# Patient Record
Sex: FEMALE | Race: WHITE | NOT HISPANIC OR LATINO | Employment: OTHER | URBAN - METROPOLITAN AREA
[De-identification: names, ages, dates, MRNs, and addresses within clinical notes are randomized per-mention and may not be internally consistent; named-entity substitution may affect disease eponyms.]

---

## 2017-10-31 ENCOUNTER — INITIAL CONSULT (OUTPATIENT)
Dept: DERMATOLOGY | Facility: CLINIC | Age: 67
End: 2017-10-31
Payer: MEDICARE

## 2017-10-31 VITALS — HEIGHT: 66 IN | WEIGHT: 180 LBS | BODY MASS INDEX: 28.93 KG/M2

## 2017-10-31 DIAGNOSIS — L82.1 SEBORRHEIC KERATOSES: Primary | ICD-10-CM

## 2017-10-31 DIAGNOSIS — D18.01 CHERRY ANGIOMA: ICD-10-CM

## 2017-10-31 DIAGNOSIS — L81.4 SOLAR LENTIGO: ICD-10-CM

## 2017-10-31 DIAGNOSIS — D22.9 MULTIPLE BENIGN NEVI: ICD-10-CM

## 2017-10-31 PROCEDURE — 99999 PR PBB SHADOW E&M-NEW PATIENT-LVL II: CPT | Mod: PBBFAC,,, | Performed by: DERMATOLOGY

## 2017-10-31 PROCEDURE — 99203 OFFICE O/P NEW LOW 30 MIN: CPT | Mod: S$GLB,,, | Performed by: DERMATOLOGY

## 2017-10-31 RX ORDER — AMOXICILLIN 500 MG
2 CAPSULE ORAL DAILY
COMMUNITY
End: 2019-12-27

## 2017-10-31 RX ORDER — MELATONIN 10 MG
CAPSULE ORAL NIGHTLY
COMMUNITY
End: 2021-01-13

## 2017-10-31 RX ORDER — MULTIVIT WITH MINERALS/HERBS
1 TABLET ORAL DAILY
COMMUNITY
End: 2019-10-21

## 2017-10-31 RX ORDER — CHOLECALCIFEROL (VITAMIN D3) 125 MCG
CAPSULE ORAL
COMMUNITY
End: 2019-10-21

## 2017-10-31 RX ORDER — HYDROGEN PEROXIDE 3 %
20 SOLUTION, NON-ORAL MISCELLANEOUS
COMMUNITY
End: 2022-02-02 | Stop reason: SDUPTHER

## 2017-10-31 NOTE — PROGRESS NOTES
Subjective:       Patient ID:  Ligia Krishnan is a 67 y.o. female who presents for   Chief Complaint   Patient presents with    Skin Check     TBSE    Spot     Face, L side     Initial visit  Previously under care Dr Gato Munoz h/o skin cancer  Denies h/o intense sun exposure; burns easily, mother very fair, protected her kids, no tanning bed use        Spot  - Initial  Affected locations: face  Duration: 4 months  Signs and Symptoms: discoloration, was dry and thick.  Severity: mild  Timing: constant  Aggravated by: nothing  Treatments tried: peroxide otc.        Review of Systems   Constitutional: Negative for fever, chills, weight loss, weight gain and night sweats.   Skin: Positive for daily sunscreen use (make up and moisturizer), activity-related sunscreen use and wears hat.   Hematologic/Lymphatic: Does not bruise/bleed easily.        Objective:    Physical Exam   Constitutional: She appears well-developed and well-nourished. No distress.   Neurological: She is alert and oriented to person, place, and time. She is not disoriented.   Psychiatric: She has a normal mood and affect.   Skin:   Areas Examined (abnormalities noted in diagram):   Scalp / Hair Palpated and Inspected  Head / Face Inspection Performed  Neck Inspection Performed  Chest / Axilla Inspection Performed  Abdomen Inspection Performed  Genitals / Buttocks / Groin Inspection Performed  Back Inspection Performed  RUE Inspected  LUE Inspection Performed  RLE Inspected  LLE Inspection Performed  Nails and Digits Inspection Performed                           Diagram Legend     Erythematous scaling macule/papule c/w actinic keratosis       Vascular papule c/w angioma      Pigmented verrucoid papule/plaque c/w seborrheic keratosis      Yellow umbilicated papule c/w sebaceous hyperplasia      Irregularly shaped tan macule c/w lentigo     1-2 mm smooth white papules consistent with Milia      Movable subcutaneous cyst with punctum c/w epidermal  inclusion cyst      Subcutaneous movable cyst c/w pilar cyst      Firm pink to brown papule c/w dermatofibroma      Pedunculated fleshy papule(s) c/w skin tag(s)      Evenly pigmented macule c/w junctional nevus     Mildly variegated pigmented, slightly irregular-bordered macule c/w mildly atypical nevus      Flesh colored to evenly pigmented papule c/w intradermal nevus       Pink pearly papule/plaque c/w basal cell carcinoma      Erythematous hyperkeratotic cursted plaque c/w SCC      Surgical scar with no sign of skin cancer recurrence      Open and closed comedones      Inflammatory papules and pustules      Verrucoid papule consistent consistent with wart     Erythematous eczematous patches and plaques     Dystrophic onycholytic nail with subungual debris c/w onychomycosis     Umbilicated papule    Erythematous-base heme-crusted tan verrucoid plaque consistent with inflamed seborrheic keratosis     Erythematous Silvery Scaling Plaque c/w Psoriasis     See annotation      Assessment / Plan:        Seborrheic keratoses  These are benign inherited growths without a malignant potential. Reassurance given to patient. No treatment is necessary.     Careful dermoscopic examination of all lesions on face, macular SKs and solar lentigo, none with concerning features    Solar lentigo  This is a benign hyperpigmented sun induced lesion. Daily sun protection will reduce the number of new lesions. Treatment of these benign lesions are considered cosmetic.    Cherry angioma  This is a benign vascular lesion. Reassurance given. No treatment required.     Multiple benign nevi  Monitor for new mole or moles that are becoming bigger, darker, irritated, or developing irregular borders.     Patient instructed in importance in daily sun protection of at least spf 30. Sun avoidance and topical protection discussed.   Recommend Elta MD (physician office) COTZ sensitive (available online) for daily use on face and neck.  Patient  encouraged to wear hat for all outdoor exposure.   Also discussed sun protective clothing.                 Return in about 1 year (around 10/31/2018).

## 2019-09-10 ENCOUNTER — TELEPHONE (OUTPATIENT)
Dept: FAMILY MEDICINE | Facility: CLINIC | Age: 69
End: 2019-09-10

## 2019-09-10 DIAGNOSIS — E06.3 HASHIMOTO'S DISEASE: Primary | ICD-10-CM

## 2019-09-10 NOTE — TELEPHONE ENCOUNTER
----- Message from Lisa Nguyễn sent at 9/10/2019  3:28 PM CDT -----  Contact: pt  Voicemail: Pt needs referral to see:    Florencia Hyde -([place])    [phone]

## 2019-10-21 ENCOUNTER — OFFICE VISIT (OUTPATIENT)
Dept: DERMATOLOGY | Facility: CLINIC | Age: 69
End: 2019-10-21
Payer: MEDICARE

## 2019-10-21 DIAGNOSIS — L73.8 SEBACEOUS HYPERPLASIA OF FACE: ICD-10-CM

## 2019-10-21 DIAGNOSIS — L82.1 SEBORRHEIC KERATOSES: Primary | ICD-10-CM

## 2019-10-21 DIAGNOSIS — D22.9 MULTIPLE BENIGN NEVI: ICD-10-CM

## 2019-10-21 DIAGNOSIS — L81.4 SOLAR LENTIGO: ICD-10-CM

## 2019-10-21 PROCEDURE — 99214 PR OFFICE/OUTPT VISIT, EST, LEVL IV, 30-39 MIN: ICD-10-PCS | Mod: S$GLB,,, | Performed by: DERMATOLOGY

## 2019-10-21 PROCEDURE — 99999 PR PBB SHADOW E&M-EST. PATIENT-LVL II: ICD-10-PCS | Mod: PBBFAC,,, | Performed by: DERMATOLOGY

## 2019-10-21 PROCEDURE — 99214 OFFICE O/P EST MOD 30 MIN: CPT | Mod: S$GLB,,, | Performed by: DERMATOLOGY

## 2019-10-21 PROCEDURE — 99999 PR PBB SHADOW E&M-EST. PATIENT-LVL II: CPT | Mod: PBBFAC,,, | Performed by: DERMATOLOGY

## 2019-10-21 RX ORDER — LEVOTHYROXINE SODIUM 50 UG/1
50 TABLET ORAL
Refills: 6 | COMMUNITY
Start: 2019-10-08 | End: 2020-07-13

## 2019-10-21 RX ORDER — LISINOPRIL 10 MG/1
10 TABLET ORAL DAILY
Refills: 5 | COMMUNITY
Start: 2019-10-08 | End: 2020-07-13

## 2019-10-21 RX ORDER — PRAVASTATIN SODIUM 40 MG/1
40 TABLET ORAL DAILY
COMMUNITY
Start: 2019-09-11 | End: 2019-12-27 | Stop reason: SDUPTHER

## 2019-10-21 RX ORDER — MULTIVITAMIN/IRON/FOLIC ACID 18MG-0.4MG
TABLET ORAL
COMMUNITY
End: 2019-12-27

## 2019-10-21 RX ORDER — ACETAMINOPHEN AND PHENYLEPHRINE HCL 325; 5 MG/1; MG/1
1 TABLET ORAL DAILY
COMMUNITY

## 2019-10-21 RX ORDER — PNV NO.95/FERROUS FUM/FOLIC AC 28MG-0.8MG
TABLET ORAL
COMMUNITY
End: 2019-12-27

## 2019-10-21 NOTE — PROGRESS NOTES
Subjective:       Patient ID:  Ligia Krishnan is a 69 y.o. female who presents for   Chief Complaint   Patient presents with    Skin Check     TBSE    Spot     face x 2 months, red and raised, no tx     Last OV 10-    Patient here today for skin check, TBSE  Also c/o spots on face x 2 months, red and raised, no tx    No PHX NMSC  Mother with AK sister with NMSC        Review of Systems   Constitutional: Negative for fever, chills, weight loss, weight gain and night sweats.   Skin: Positive for daily sunscreen use (make up and moisturizer), activity-related sunscreen use and wears hat.   Hematologic/Lymphatic: Does not bruise/bleed easily.        Objective:    Physical Exam   Constitutional: She appears well-developed and well-nourished. No distress.   Neurological: She is alert and oriented to person, place, and time. She is not disoriented.   Psychiatric: She has a normal mood and affect.   Skin:   Areas Examined (abnormalities noted in diagram):   Scalp / Hair Palpated and Inspected  Head / Face Inspection Performed  Neck Inspection Performed  Chest / Axilla Inspection Performed  Abdomen Inspection Performed  Genitals / Buttocks / Groin Inspection Performed  Back Inspection Performed  RUE Inspected  LUE Inspection Performed  RLE Inspected  LLE Inspection Performed  Nails and Digits Inspection Performed                           Diagram Legend     Erythematous scaling macule/papule c/w actinic keratosis       Vascular papule c/w angioma      Pigmented verrucoid papule/plaque c/w seborrheic keratosis      Yellow umbilicated papule c/w sebaceous hyperplasia      Irregularly shaped tan macule c/w lentigo     1-2 mm smooth white papules consistent with Milia      Movable subcutaneous cyst with punctum c/w epidermal inclusion cyst      Subcutaneous movable cyst c/w pilar cyst      Firm pink to brown papule c/w dermatofibroma      Pedunculated fleshy papule(s) c/w skin tag(s)      Evenly pigmented macule  c/w junctional nevus     Mildly variegated pigmented, slightly irregular-bordered macule c/w mildly atypical nevus      Flesh colored to evenly pigmented papule c/w intradermal nevus       Pink pearly papule/plaque c/w basal cell carcinoma      Erythematous hyperkeratotic cursted plaque c/w SCC      Surgical scar with no sign of skin cancer recurrence      Open and closed comedones      Inflammatory papules and pustules      Verrucoid papule consistent consistent with wart     Erythematous eczematous patches and plaques     Dystrophic onycholytic nail with subungual debris c/w onychomycosis     Umbilicated papule    Erythematous-base heme-crusted tan verrucoid plaque consistent with inflamed seborrheic keratosis     Erythematous Silvery Scaling Plaque c/w Psoriasis     See annotation      Assessment / Plan:        Seborrheic keratoses  These are benign inherited growths without a malignant potential. Reassurance given to patient. No treatment is necessary.     Solar lentigo  This is a benign hyperpigmented sun induced lesion. Daily sun protection will reduce the number of new lesions. Treatment of these benign lesions are considered cosmetic.    Sebaceous hyperplasia of face  This is a common condition representing benign enlargement of the sebaceous lobule. It typically occurs in adulthood. Reassurance given to patient.     Multiple benign nevi  Monitor for new mole or moles that are becoming bigger, darker, irritated, or developing irregular borders.     Total body skin examination performed today including at least 12 points as noted in physical examination. No lesions suspicious for malignancy noted.    Patient instructed in importance in daily sun protection of at least spf 30. Mineral sunscreen ingredients preferred (Zinc +/- Titanium).   Recommend Elta MD for daily use on face and neck.  Patient encouraged to wear hat for all outdoor exposure.   Also discussed sun avoidance and use of protective  clothing.             Follow up in about 1 year (around 10/21/2020), or if symptoms worsen or fail to improve.

## 2019-10-28 ENCOUNTER — TELEPHONE (OUTPATIENT)
Dept: FAMILY MEDICINE | Facility: CLINIC | Age: 69
End: 2019-10-28

## 2019-10-28 NOTE — TELEPHONE ENCOUNTER
----- Message from Ila Judge sent at 10/28/2019 12:42 PM CDT -----  vm- patient needs a call back       Spoke with patient - needs a copy of eCW labs and shot record.  Will  tomorrow.

## 2019-12-26 PROBLEM — E03.9 HYPOTHYROIDISM: Status: ACTIVE | Noted: 2019-06-14

## 2019-12-26 PROBLEM — E78.5 HYPERLIPIDEMIA: Status: ACTIVE | Noted: 2019-12-26

## 2019-12-26 PROBLEM — Z96.643 PRESENCE OF ARTIFICIAL HIP JOINT, BILATERAL: Status: ACTIVE | Noted: 2018-05-23

## 2019-12-26 PROBLEM — E06.3 AUTOIMMUNE THYROIDITIS: Status: ACTIVE | Noted: 2019-06-14

## 2019-12-26 PROBLEM — K21.9 GASTRO-ESOPHAGEAL REFLUX DISEASE WITHOUT ESOPHAGITIS: Status: ACTIVE | Noted: 2017-02-22

## 2019-12-26 PROBLEM — N18.30 CHRONIC RENAL INSUFFICIENCY, STAGE III (MODERATE): Status: ACTIVE | Noted: 2017-05-16

## 2019-12-27 ENCOUNTER — OFFICE VISIT (OUTPATIENT)
Dept: FAMILY MEDICINE | Facility: CLINIC | Age: 69
End: 2019-12-27
Payer: MEDICARE

## 2019-12-27 VITALS
SYSTOLIC BLOOD PRESSURE: 150 MMHG | WEIGHT: 252 LBS | DIASTOLIC BLOOD PRESSURE: 92 MMHG | HEART RATE: 96 BPM | HEIGHT: 66 IN | BODY MASS INDEX: 40.5 KG/M2

## 2019-12-27 DIAGNOSIS — T46.4X5A ACE-INHIBITOR COUGH: ICD-10-CM

## 2019-12-27 DIAGNOSIS — K21.9 GASTRO-ESOPHAGEAL REFLUX DISEASE WITHOUT ESOPHAGITIS: ICD-10-CM

## 2019-12-27 DIAGNOSIS — Z96.643 PRESENCE OF ARTIFICIAL HIP JOINT, BILATERAL: ICD-10-CM

## 2019-12-27 DIAGNOSIS — E06.3 AUTOIMMUNE THYROIDITIS: ICD-10-CM

## 2019-12-27 DIAGNOSIS — R05.8 ACE-INHIBITOR COUGH: ICD-10-CM

## 2019-12-27 DIAGNOSIS — I10 ESSENTIAL HYPERTENSION: Primary | ICD-10-CM

## 2019-12-27 DIAGNOSIS — E03.9 HYPOTHYROIDISM, UNSPECIFIED TYPE: ICD-10-CM

## 2019-12-27 DIAGNOSIS — E78.5 HYPERLIPIDEMIA, UNSPECIFIED HYPERLIPIDEMIA TYPE: ICD-10-CM

## 2019-12-27 DIAGNOSIS — N39.41 URGE URINARY INCONTINENCE: ICD-10-CM

## 2019-12-27 DIAGNOSIS — M17.0 PRIMARY OSTEOARTHRITIS OF BOTH KNEES: ICD-10-CM

## 2019-12-27 PROBLEM — N18.30 CHRONIC RENAL INSUFFICIENCY, STAGE III (MODERATE): Status: RESOLVED | Noted: 2017-05-16 | Resolved: 2019-12-27

## 2019-12-27 PROCEDURE — 3080F PR MOST RECENT DIASTOLIC BLOOD PRESSURE >= 90 MM HG: ICD-10-PCS | Mod: S$GLB,,, | Performed by: FAMILY MEDICINE

## 2019-12-27 PROCEDURE — 3077F SYST BP >= 140 MM HG: CPT | Mod: S$GLB,,, | Performed by: FAMILY MEDICINE

## 2019-12-27 PROCEDURE — 99214 OFFICE O/P EST MOD 30 MIN: CPT | Mod: S$GLB,,, | Performed by: FAMILY MEDICINE

## 2019-12-27 PROCEDURE — 3080F DIAST BP >= 90 MM HG: CPT | Mod: S$GLB,,, | Performed by: FAMILY MEDICINE

## 2019-12-27 PROCEDURE — 3077F PR MOST RECENT SYSTOLIC BLOOD PRESSURE >= 140 MM HG: ICD-10-PCS | Mod: S$GLB,,, | Performed by: FAMILY MEDICINE

## 2019-12-27 PROCEDURE — 1159F MED LIST DOCD IN RCRD: CPT | Mod: S$GLB,,, | Performed by: FAMILY MEDICINE

## 2019-12-27 PROCEDURE — 99214 PR OFFICE/OUTPT VISIT, EST, LEVL IV, 30-39 MIN: ICD-10-PCS | Mod: S$GLB,,, | Performed by: FAMILY MEDICINE

## 2019-12-27 PROCEDURE — 1159F PR MEDICATION LIST DOCUMENTED IN MEDICAL RECORD: ICD-10-PCS | Mod: S$GLB,,, | Performed by: FAMILY MEDICINE

## 2019-12-27 RX ORDER — LEVOTHYROXINE SODIUM 50 UG/1
50 TABLET ORAL
Qty: 90 TABLET | Refills: 1 | Status: CANCELLED | OUTPATIENT
Start: 2019-12-27

## 2019-12-27 RX ORDER — LISINOPRIL 10 MG/1
10 TABLET ORAL DAILY
Qty: 90 TABLET | Refills: 1 | Status: CANCELLED | OUTPATIENT
Start: 2019-12-27

## 2019-12-27 RX ORDER — PRAVASTATIN SODIUM 40 MG/1
40 TABLET ORAL DAILY
Qty: 90 TABLET | Refills: 1 | Status: SHIPPED | OUTPATIENT
Start: 2019-12-27 | End: 2020-06-27 | Stop reason: SDUPTHER

## 2019-12-27 RX ORDER — HYDROCHLOROTHIAZIDE 25 MG/1
25 TABLET ORAL DAILY
Qty: 30 TABLET | Refills: 1 | Status: SHIPPED | OUTPATIENT
Start: 2019-12-27 | End: 2020-02-21 | Stop reason: SDUPTHER

## 2019-12-27 NOTE — PROGRESS NOTES
SUBJECTIVE:    Patient ID: Ligia Krishnan is a 69 y.o. female.    Chief Complaint: Hypertension (brought pictures of bottles -ac )    This 69-year-old female has had a constant hacking cough since starting lisinopril several months ago.  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  She is using cough syrup all night every night and is not sleeping well due to the coughing.    She states she has been under lots of stress with home repairs and recently long extended holiday family visits.  She is getting less sleep and feels rather fatigued.    She complains of severe knee pains due to arthritis and takes Aleve 1-2 tablets twice a day.  She has been eating plenty of salty foods recently      No visits with results within 6 Month(s) from this visit.   Latest known visit with results is:   No results found for any previous visit.       Past Medical History:   Diagnosis Date    Chronic renal insufficiency, stage III (moderate) 5/16/2017    Esophageal reflux     Hyperlipidemia     Hypertension     OA (osteoarthritis) of hip     bilateral     Past Surgical History:   Procedure Laterality Date    APPENDECTOMY  1957    CATARACT EXTRACTION Left 10/2018    COLONOSCOPY  2016    Dr. jose-RTC 7 years    JOINT REPLACEMENT Right 6/13/13    (R) DAPHNEY    JOINT REPLACEMENT Left 10/18/12    (L) DAPHNEY     Family History   Problem Relation Age of Onset    Melanoma Neg Hx     Lupus Neg Hx     Eczema Neg Hx     Psoriasis Neg Hx        Marital Status:   Alcohol History:  reports that she drinks alcohol.  Tobacco History:  reports that she has never smoked. She has never used smokeless tobacco.  Drug History:  has no drug history on file.    Review of patient's allergies indicates:   Allergen Reactions    Morphine Nausea And Vomiting       Current Outpatient Medications:     biotin 10,000 mcg Cap, Take 1 capsule by mouth once daily. , Disp: , Rfl:     estradiol (ESTRACE)  0.5 MG tablet, Take 0.25 mg by mouth once daily. , Disp: , Rfl: 0    LACTOBACILLUS ACIDOPHILUS (PROBIOTIC ORAL), Take by mouth., Disp: , Rfl:     lisinopril 10 MG tablet, Take 10 mg by mouth once daily., Disp: , Rfl: 5    melatonin 10 mg Cap, Take by mouth every evening., Disp: , Rfl:     multivitamin capsule, Take 1 capsule by mouth daily as needed. , Disp: , Rfl:     pravastatin (PRAVACHOL) 40 MG tablet, Take 1 tablet (40 mg total) by mouth once daily., Disp: 90 tablet, Rfl: 1    progesterone (PROMETRIUM) 100 MG capsule, Take 100 mg by mouth once daily. , Disp: , Rfl: 0    SYNTHROID 50 mcg tablet, Take 50 mcg by mouth before breakfast. , Disp: , Rfl: 6    calcium carbonate/vitamin D2 (LIQUID CALCIUM 600-VITAMIN D ORAL), , Disp: , Rfl:     esomeprazole (NEXIUM) 20 MG capsule, Take 20 mg by mouth before breakfast., Disp: , Rfl:     hydroCHLOROthiazide (HYDRODIURIL) 25 MG tablet, Take 1 tablet (25 mg total) by mouth once daily., Disp: 30 tablet, Rfl: 1    Review of Systems   Constitutional: Negative for appetite change, chills, fatigue, fever and unexpected weight change.   HENT: Negative for congestion, ear pain, sinus pain, sore throat and trouble swallowing.    Eyes: Negative for pain, discharge and visual disturbance.   Respiratory: Negative for apnea, cough (chronic cough on ACE, ), shortness of breath and wheezing.    Cardiovascular: Negative for chest pain, palpitations and leg swelling (pedal edema daily).   Gastrointestinal: Negative for abdominal pain, blood in stool, constipation, diarrhea, nausea and vomiting.   Endocrine: Negative for heat intolerance, polydipsia and polyuria.   Genitourinary: Negative for difficulty urinating, dyspareunia, dysuria, frequency, hematuria and menstrual problem.   Musculoskeletal: Negative for arthralgias (lf > rt  knee pains, ), back pain (occas  back aches , no sciatica), gait problem, joint swelling and myalgias.        Cant sleep on either  Hip, after hip  "replacements   Skin:        Hair thinning on benicar   Allergic/Immunologic: Negative for environmental allergies, food allergies and immunocompromised state.   Neurological: Negative for dizziness, tremors, seizures, numbness and headaches.   Psychiatric/Behavioral: Negative for behavioral problems, confusion, hallucinations and suicidal ideas. The patient is not nervous/anxious.           Objective:      Vitals:    12/27/19 0834 12/27/19 0839 12/27/19 0929   BP: (!) 156/96 (!) 160/100 (!) 150/92   Pulse: 96     Weight: 114.3 kg (252 lb)     Height: 5' 6" (1.676 m)       Body mass index is 40.67 kg/m².  Physical Exam   Constitutional: She is oriented to person, place, and time. She appears well-developed and well-nourished.   HENT:   Head: Normocephalic and atraumatic.   Right Ear: External ear normal.   Left Ear: External ear normal.   Nose: Nose normal.   Mouth/Throat: Oropharynx is clear and moist.   Eyes: Pupils are equal, round, and reactive to light. EOM are normal.   Neck: Normal range of motion. Neck supple. Carotid bruit is not present. No thyromegaly present.   Cardiovascular: Normal rate, regular rhythm, normal heart sounds and intact distal pulses.   No murmur heard.  Pulmonary/Chest: Effort normal and breath sounds normal. She has no wheezes. She has no rales.   Abdominal: Soft. Bowel sounds are normal. She exhibits no distension. There is no hepatosplenomegaly. There is no tenderness.   Musculoskeletal: Normal range of motion. She exhibits edema (1+ ankle edema bilaterally). She exhibits no tenderness or deformity.        Lumbar back: Normal. She exhibits no pain and no spasm.   Bends 90 degrees at  Waist, bilateral crepitant knees , trace  Ankle  Edema    Lymphadenopathy:     She has no cervical adenopathy.   Neurological: She is alert and oriented to person, place, and time. No cranial nerve deficit. Coordination normal.   Skin: Skin is warm and dry. No rash noted.   Psychiatric: She has a normal " mood and affect. Her behavior is normal. Judgment and thought content normal.   Nursing note and vitals reviewed.        Assessment:       1. Essential hypertension    2. Hyperlipidemia, unspecified hyperlipidemia type    3. Hypothyroidism, unspecified type    4. Urge urinary incontinence    5. Autoimmune thyroiditis    6. Gastro-esophageal reflux disease without esophagitis    7. Presence of artificial hip joint, bilateral    8. ACE-inhibitor cough    9. Primary osteoarthritis of both knees         Plan:       Essential hypertension  -     Comprehensive metabolic panel; Future; Expected date: 12/27/2019  -     Lipid panel; Future; Expected date: 12/27/2019  -     CBC auto differential; Future; Expected date: 12/27/2019  -     hydroCHLOROthiazide (HYDRODIURIL) 25 MG tablet; Take 1 tablet (25 mg total) by mouth once daily.  Dispense: 30 tablet; Refill: 1  DC lisinopril due to cough, add HCTZ 25 mg daily RTC 2 weeks for a nurse visit for blood pressure check, reduce the use of NSAIDs  Hyperlipidemia, unspecified hyperlipidemia type  -     pravastatin (PRAVACHOL) 40 MG tablet; Take 1 tablet (40 mg total) by mouth once daily.  Dispense: 90 tablet; Refill: 1  Lipids to be drawn today  Hypothyroidism, unspecified type   following her thyroid  Urge urinary incontinence    Autoimmune thyroiditis    Gastro-esophageal reflux disease without esophagitis    Presence of artificial hip joint, bilateral    ACE-inhibitor cough  DC lisinopril    Follow up in about 2 weeks (around 1/10/2020) for BP Check-Up with Nurse.

## 2019-12-28 LAB
ALBUMIN SERPL-MCNC: 4.2 G/DL (ref 3.6–5.1)
ALBUMIN/GLOB SERPL: 1.8 (CALC) (ref 1–2.5)
ALP SERPL-CCNC: 79 U/L (ref 33–130)
ALT SERPL-CCNC: 17 U/L (ref 6–29)
AST SERPL-CCNC: 18 U/L (ref 10–35)
BASOPHILS # BLD AUTO: 42 CELLS/UL (ref 0–200)
BASOPHILS NFR BLD AUTO: 0.7 %
BILIRUB SERPL-MCNC: 0.4 MG/DL (ref 0.2–1.2)
BUN SERPL-MCNC: 12 MG/DL (ref 7–25)
BUN/CREAT SERPL: ABNORMAL (CALC) (ref 6–22)
CALCIUM SERPL-MCNC: 9.7 MG/DL (ref 8.6–10.4)
CHLORIDE SERPL-SCNC: 106 MMOL/L (ref 98–110)
CHOLEST SERPL-MCNC: 165 MG/DL
CHOLEST/HDLC SERPL: 2.4 (CALC)
CO2 SERPL-SCNC: 30 MMOL/L (ref 20–32)
CREAT SERPL-MCNC: 0.89 MG/DL (ref 0.5–0.99)
EOSINOPHIL # BLD AUTO: 330 CELLS/UL (ref 15–500)
EOSINOPHIL NFR BLD AUTO: 5.5 %
ERYTHROCYTE [DISTWIDTH] IN BLOOD BY AUTOMATED COUNT: 13.1 % (ref 11–15)
GFRSERPLBLD MDRD-ARVRAT: 67 ML/MIN/1.73M2
GLOBULIN SER CALC-MCNC: 2.4 G/DL (CALC) (ref 1.9–3.7)
GLUCOSE SERPL-MCNC: 107 MG/DL (ref 65–99)
HCT VFR BLD AUTO: 35.1 % (ref 35–45)
HDLC SERPL-MCNC: 68 MG/DL
HGB BLD-MCNC: 11.8 G/DL (ref 11.7–15.5)
LDLC SERPL CALC-MCNC: 77 MG/DL (CALC)
LYMPHOCYTES # BLD AUTO: 2076 CELLS/UL (ref 850–3900)
LYMPHOCYTES NFR BLD AUTO: 34.6 %
MCH RBC QN AUTO: 29.9 PG (ref 27–33)
MCHC RBC AUTO-ENTMCNC: 33.6 G/DL (ref 32–36)
MCV RBC AUTO: 89.1 FL (ref 80–100)
MONOCYTES # BLD AUTO: 426 CELLS/UL (ref 200–950)
MONOCYTES NFR BLD AUTO: 7.1 %
NEUTROPHILS # BLD AUTO: 3126 CELLS/UL (ref 1500–7800)
NEUTROPHILS NFR BLD AUTO: 52.1 %
NONHDLC SERPL-MCNC: 97 MG/DL (CALC)
PLATELET # BLD AUTO: 229 THOUSAND/UL (ref 140–400)
PMV BLD REES-ECKER: 10.6 FL (ref 7.5–12.5)
POTASSIUM SERPL-SCNC: 4.5 MMOL/L (ref 3.5–5.3)
PROT SERPL-MCNC: 6.6 G/DL (ref 6.1–8.1)
RBC # BLD AUTO: 3.94 MILLION/UL (ref 3.8–5.1)
SODIUM SERPL-SCNC: 142 MMOL/L (ref 135–146)
TRIGL SERPL-MCNC: 121 MG/DL
WBC # BLD AUTO: 6 THOUSAND/UL (ref 3.8–10.8)

## 2019-12-30 ENCOUNTER — TELEPHONE (OUTPATIENT)
Dept: FAMILY MEDICINE | Facility: CLINIC | Age: 69
End: 2019-12-30

## 2019-12-30 NOTE — TELEPHONE ENCOUNTER
----- Message from Hemant Zhang MD sent at 12/28/2019  1:01 PM CST -----  Call patient.  Lab work looks excellent.  Glucose is 107, the rest of the labs were entirely normal.  Cholesterol 165 and triglycerides 121.  Continue current medications, recheck in 6 months.  CBC CMP lipids TSH

## 2019-12-30 NOTE — PROGRESS NOTES
Spoke to patient with results verbatim per Dr Zhang. Verbalized understanding on all. Remind me created.

## 2020-01-31 ENCOUNTER — CLINICAL SUPPORT (OUTPATIENT)
Dept: FAMILY MEDICINE | Facility: CLINIC | Age: 70
End: 2020-01-31
Payer: MEDICARE

## 2020-01-31 VITALS — SYSTOLIC BLOOD PRESSURE: 150 MMHG | DIASTOLIC BLOOD PRESSURE: 100 MMHG

## 2020-02-03 DIAGNOSIS — Z12.31 ENCOUNTER FOR SCREENING MAMMOGRAM FOR MALIGNANT NEOPLASM OF BREAST: Primary | ICD-10-CM

## 2020-02-05 ENCOUNTER — HOSPITAL ENCOUNTER (OUTPATIENT)
Dept: RADIOLOGY | Facility: HOSPITAL | Age: 70
Discharge: HOME | End: 2020-02-05
Attending: OBSTETRICS & GYNECOLOGY
Payer: MEDICARE

## 2020-02-05 VITALS — HEIGHT: 66 IN | BODY MASS INDEX: 40.5 KG/M2 | WEIGHT: 252 LBS

## 2020-02-05 DIAGNOSIS — Z12.31 ENCOUNTER FOR SCREENING MAMMOGRAM FOR MALIGNANT NEOPLASM OF BREAST: ICD-10-CM

## 2020-02-05 PROCEDURE — 77067 SCR MAMMO BI INCL CAD: CPT | Mod: TC,PO

## 2020-02-21 ENCOUNTER — CLINICAL SUPPORT (OUTPATIENT)
Dept: FAMILY MEDICINE | Facility: CLINIC | Age: 70
End: 2020-02-21
Payer: MEDICARE

## 2020-02-21 VITALS
HEIGHT: 66 IN | WEIGHT: 250 LBS | SYSTOLIC BLOOD PRESSURE: 140 MMHG | BODY MASS INDEX: 40.18 KG/M2 | DIASTOLIC BLOOD PRESSURE: 96 MMHG | HEART RATE: 84 BPM

## 2020-02-21 DIAGNOSIS — I10 ESSENTIAL HYPERTENSION: ICD-10-CM

## 2020-02-21 RX ORDER — AMLODIPINE BESYLATE 2.5 MG/1
2.5 TABLET ORAL NIGHTLY
Qty: 30 TABLET | Refills: 2 | Status: SHIPPED | OUTPATIENT
Start: 2020-02-21 | End: 2020-03-27

## 2020-02-21 RX ORDER — HYDROCHLOROTHIAZIDE 25 MG/1
25 TABLET ORAL DAILY
Qty: 30 TABLET | Refills: 1 | Status: SHIPPED | OUTPATIENT
Start: 2020-02-21 | End: 2020-04-30 | Stop reason: SDUPTHER

## 2020-03-13 ENCOUNTER — CLINICAL SUPPORT (OUTPATIENT)
Dept: FAMILY MEDICINE | Facility: CLINIC | Age: 70
End: 2020-03-13
Payer: MEDICARE

## 2020-03-13 VITALS — DIASTOLIC BLOOD PRESSURE: 98 MMHG | SYSTOLIC BLOOD PRESSURE: 152 MMHG

## 2020-03-13 DIAGNOSIS — I10 ESSENTIAL HYPERTENSION: Primary | ICD-10-CM

## 2020-03-13 RX ORDER — AMLODIPINE BESYLATE 5 MG/1
5 TABLET ORAL DAILY
Qty: 30 TABLET | Refills: 0 | Status: SHIPPED | OUTPATIENT
Start: 2020-03-13 | End: 2020-03-27

## 2020-03-13 NOTE — PROGRESS NOTES
Pt presents for BP check.  Per Dr. Zhang, increase Amlodipine to 5mg daily and return to office in 2 weeks for a nurse visit for BP check.

## 2020-03-24 ENCOUNTER — PATIENT MESSAGE (OUTPATIENT)
Dept: FAMILY MEDICINE | Facility: CLINIC | Age: 70
End: 2020-03-24

## 2020-03-26 ENCOUNTER — PATIENT MESSAGE (OUTPATIENT)
Dept: FAMILY MEDICINE | Facility: CLINIC | Age: 70
End: 2020-03-26

## 2020-03-27 ENCOUNTER — OFFICE VISIT (OUTPATIENT)
Dept: FAMILY MEDICINE | Facility: CLINIC | Age: 70
End: 2020-03-27
Payer: MEDICARE

## 2020-03-27 ENCOUNTER — TELEPHONE (OUTPATIENT)
Dept: FAMILY MEDICINE | Facility: CLINIC | Age: 70
End: 2020-03-27

## 2020-03-27 ENCOUNTER — PATIENT MESSAGE (OUTPATIENT)
Dept: FAMILY MEDICINE | Facility: CLINIC | Age: 70
End: 2020-03-27

## 2020-03-27 DIAGNOSIS — I10 ESSENTIAL HYPERTENSION: Primary | ICD-10-CM

## 2020-03-27 DIAGNOSIS — E78.2 MIXED HYPERLIPIDEMIA: ICD-10-CM

## 2020-03-27 DIAGNOSIS — E06.3 AUTOIMMUNE THYROIDITIS: ICD-10-CM

## 2020-03-27 DIAGNOSIS — E03.9 ACQUIRED HYPOTHYROIDISM: ICD-10-CM

## 2020-03-27 DIAGNOSIS — M17.0 PRIMARY OSTEOARTHRITIS OF BOTH KNEES: ICD-10-CM

## 2020-03-27 DIAGNOSIS — Z96.643 PRESENCE OF ARTIFICIAL HIP JOINT, BILATERAL: ICD-10-CM

## 2020-03-27 PROCEDURE — 1159F PR MEDICATION LIST DOCUMENTED IN MEDICAL RECORD: ICD-10-PCS | Mod: 95,,, | Performed by: FAMILY MEDICINE

## 2020-03-27 PROCEDURE — 99213 PR OFFICE/OUTPT VISIT, EST, LEVL III, 20-29 MIN: ICD-10-PCS | Mod: 95,,, | Performed by: FAMILY MEDICINE

## 2020-03-27 PROCEDURE — 99213 OFFICE O/P EST LOW 20 MIN: CPT | Mod: 95,,, | Performed by: FAMILY MEDICINE

## 2020-03-27 PROCEDURE — 1159F MED LIST DOCD IN RCRD: CPT | Mod: 95,,, | Performed by: FAMILY MEDICINE

## 2020-03-27 RX ORDER — AMLODIPINE BESYLATE 5 MG/1
5 TABLET ORAL DAILY
Qty: 90 TABLET | Refills: 1 | Status: SHIPPED | OUTPATIENT
Start: 2020-03-27 | End: 2020-07-13 | Stop reason: SDUPTHER

## 2020-03-27 NOTE — PROGRESS NOTES
SUBJECTIVE:    Patient ID: Ligia Krishnan is a 69 y.o. female.    Chief Complaint: No chief complaint on file.    This 69-year-old female is here for a telemedicine visit.  She has hypertension and we have been trying hard to get it under control.  She was taken off lisinopril 10 mg due to a chronic cough.  Today she is maintained on amlodipine 5 mg daily and HCTZ 25 mg q.day she has been camping with her  and friends over the last 2 weeks.  Some of the foods have had some salt in it.  Blood pressures ranging from 144 over 81 up to 156/83.  She feels well; has no chest pain no upper respiratory symptoms such as fever cough or shortness of breath.      Office Visit on 12/27/2019   Component Date Value Ref Range Status    Glucose 12/27/2019 107* 65 - 99 mg/dL Final    BUN, Bld 12/27/2019 12  7 - 25 mg/dL Final    Creatinine 12/27/2019 0.89  0.50 - 0.99 mg/dL Final    eGFR if non  12/27/2019 67  > OR = 60 mL/min/1.73m2 Final    eGFR if African American 12/27/2019 78  > OR = 60 mL/min/1.73m2 Final    BUN/Creatinine Ratio 12/27/2019 NOT APPLICABLE  6 - 22 (calc) Final    Sodium 12/27/2019 142  135 - 146 mmol/L Final    Potassium 12/27/2019 4.5  3.5 - 5.3 mmol/L Final    Chloride 12/27/2019 106  98 - 110 mmol/L Final    CO2 12/27/2019 30  20 - 32 mmol/L Final    Calcium 12/27/2019 9.7  8.6 - 10.4 mg/dL Final    Total Protein 12/27/2019 6.6  6.1 - 8.1 g/dL Final    Albumin 12/27/2019 4.2  3.6 - 5.1 g/dL Final    Globulin, Total 12/27/2019 2.4  1.9 - 3.7 g/dL (calc) Final    Albumin/Globulin Ratio 12/27/2019 1.8  1.0 - 2.5 (calc) Final    Total Bilirubin 12/27/2019 0.4  0.2 - 1.2 mg/dL Final    Alkaline Phosphatase 12/27/2019 79  33 - 130 U/L Final    AST 12/27/2019 18  10 - 35 U/L Final    ALT 12/27/2019 17  6 - 29 U/L Final    Cholesterol 12/27/2019 165  <200 mg/dL Final    HDL 12/27/2019 68  >50 mg/dL Final    Triglycerides 12/27/2019 121  <150 mg/dL Final    LDL  Cholesterol 12/27/2019 77  mg/dL (calc) Final    Hdl/Cholesterol Ratio 12/27/2019 2.4  <5.0 (calc) Final    Non HDL Chol. (LDL+VLDL) 12/27/2019 97  <130 mg/dL (calc) Final    WBC 12/27/2019 6.0  3.8 - 10.8 Thousand/uL Final    RBC 12/27/2019 3.94  3.80 - 5.10 Million/uL Final    Hemoglobin 12/27/2019 11.8  11.7 - 15.5 g/dL Final    Hematocrit 12/27/2019 35.1  35.0 - 45.0 % Final    Mean Corpuscular Volume 12/27/2019 89.1  80.0 - 100.0 fL Final    Mean Corpuscular Hemoglobin 12/27/2019 29.9  27.0 - 33.0 pg Final    Mean Corpuscular Hemoglobin Conc 12/27/2019 33.6  32.0 - 36.0 g/dL Final    RDW 12/27/2019 13.1  11.0 - 15.0 % Final    Platelets 12/27/2019 229  140 - 400 Thousand/uL Final    MPV 12/27/2019 10.6  7.5 - 12.5 fL Final    Neutrophils Absolute 12/27/2019 3,126  1,500 - 7,800 cells/uL Final    Lymph # 12/27/2019 2,076  850 - 3,900 cells/uL Final    Mono # 12/27/2019 426  200 - 950 cells/uL Final    Eos # 12/27/2019 330  15 - 500 cells/uL Final    Baso # 12/27/2019 42  0 - 200 cells/uL Final    Neutrophils Relative 12/27/2019 52.1  % Final    Lymph% 12/27/2019 34.6  % Final    Mono% 12/27/2019 7.1  % Final    Eosinophil% 12/27/2019 5.5  % Final    Basophil% 12/27/2019 0.7  % Final       Past Medical History:   Diagnosis Date    Chronic renal insufficiency, stage III (moderate) 5/16/2017    Esophageal reflux     Hyperlipidemia     Hypertension     OA (osteoarthritis) of hip     bilateral     Past Surgical History:   Procedure Laterality Date    APPENDECTOMY  1957    BREAST BIOPSY      BREAST CYST ASPIRATION      CATARACT EXTRACTION Left 10/2018    COLONOSCOPY  2016    Dr. jose-RTC 7 years    JOINT REPLACEMENT Right 6/13/13    (R) DAPHNEY    JOINT REPLACEMENT Left 10/18/12    (L) DAPHNEY     Family History   Problem Relation Age of Onset    Melanoma Neg Hx     Lupus Neg Hx     Eczema Neg Hx     Psoriasis Neg Hx        Marital Status:   Alcohol History:  reports that she  drinks alcohol.  Tobacco History:  reports that she has never smoked. She has never used smokeless tobacco.  Drug History:  has no drug history on file.    Review of patient's allergies indicates:   Allergen Reactions    Morphine Nausea And Vomiting       Current Outpatient Medications:     amLODIPine (NORVASC) 5 MG tablet, Take 1 tablet (5 mg total) by mouth once daily., Disp: 90 tablet, Rfl: 1    biotin 10,000 mcg Cap, Take 1 capsule by mouth once daily. , Disp: , Rfl:     calcium carbonate/vitamin D2 (LIQUID CALCIUM 600-VITAMIN D ORAL), , Disp: , Rfl:     esomeprazole (NEXIUM) 20 MG capsule, Take 20 mg by mouth before breakfast., Disp: , Rfl:     estradiol (ESTRACE) 0.5 MG tablet, Take 0.25 mg by mouth once daily. , Disp: , Rfl: 0    hydroCHLOROthiazide (HYDRODIURIL) 25 MG tablet, Take 1 tablet (25 mg total) by mouth once daily., Disp: 30 tablet, Rfl: 1    LACTOBACILLUS ACIDOPHILUS (PROBIOTIC ORAL), Take by mouth., Disp: , Rfl:     lisinopril 10 MG tablet, Take 10 mg by mouth once daily., Disp: , Rfl: 5    melatonin 10 mg Cap, Take by mouth every evening., Disp: , Rfl:     multivitamin capsule, Take 1 capsule by mouth daily as needed. , Disp: , Rfl:     pravastatin (PRAVACHOL) 40 MG tablet, Take 1 tablet (40 mg total) by mouth once daily., Disp: 90 tablet, Rfl: 1    progesterone (PROMETRIUM) 100 MG capsule, Take 100 mg by mouth once daily. , Disp: , Rfl: 0    SYNTHROID 50 mcg tablet, Take 50 mcg by mouth before breakfast. , Disp: , Rfl: 6    Review of Systems   Constitutional: Negative for appetite change, chills, fatigue, fever and unexpected weight change.   HENT: Positive for congestion. Negative for ear pain, sinus pain, sore throat and trouble swallowing.         Springtime  Allergies to pollen ,cant take antihistamines due to dry eyes   Eyes: Negative for pain, discharge and visual disturbance.   Respiratory: Negative for apnea, cough, shortness of breath and wheezing.    Cardiovascular:  Negative for chest pain, palpitations and leg swelling.        BP running a little better   Gastrointestinal: Negative for abdominal pain, blood in stool, constipation, diarrhea, nausea and vomiting.   Endocrine: Negative for heat intolerance, polydipsia and polyuria.   Genitourinary: Negative for difficulty urinating, dyspareunia, dysuria, frequency, hematuria and menstrual problem.   Musculoskeletal: Negative for arthralgias, back pain, gait problem, joint swelling and myalgias.   Allergic/Immunologic: Negative for environmental allergies, food allergies and immunocompromised state.   Neurological: Negative for dizziness, tremors, seizures, numbness and headaches.   Psychiatric/Behavioral: Negative for behavioral problems, confusion, hallucinations and suicidal ideas. The patient is not nervous/anxious.           Objective:      There were no vitals filed for this visit.  There is no height or weight on file to calculate BMI.  Physical Exam      Assessment:       1. Essential hypertension    2. Mixed hyperlipidemia    3. Acquired hypothyroidism    4. Autoimmune thyroiditis    5. Presence of artificial hip joint, bilateral    6. Primary osteoarthritis of both knees         Plan:       Essential hypertension  Blood pressure is improving.  This morning she got 147/78 with a pulse of 80 on her home monitor.  Will continue current medications and have her come back in July.  We may have to increase amlodipine up to 10 mg daily.  Mixed hyperlipidemia  Cholesterol is at goal at 165.  Continue current medications  Acquired hypothyroidism  Continue levothyroxine  Autoimmune thyroiditis    Presence of artificial hip joint, bilateral    Primary osteoarthritis of both knees  Not very symptomatic at this time.    Follow up in about 4 months (around 7/27/2020) for Hypertension.      The patient location is:  home  Visit type: Virtual visit with synchronous audio and video    Total time spent with patient: 20     Each patient to  whom he or she provides medical services by telemedicine is:  (1) informed of the relationship between the physician and patient and the respective role of any other health care provider with respect to management of the patient; and (2) notified that he or she may decline to receive medical services by telemedicine and may withdraw from such care at any time.     This note was created using [employer] voice recognition software that occasionally misinterprets phrases or words.

## 2020-04-30 ENCOUNTER — PATIENT MESSAGE (OUTPATIENT)
Dept: FAMILY MEDICINE | Facility: CLINIC | Age: 70
End: 2020-04-30

## 2020-04-30 DIAGNOSIS — I10 ESSENTIAL HYPERTENSION: ICD-10-CM

## 2020-04-30 RX ORDER — HYDROCHLOROTHIAZIDE 25 MG/1
25 TABLET ORAL DAILY
Qty: 30 TABLET | Refills: 1 | Status: SHIPPED | OUTPATIENT
Start: 2020-04-30 | End: 2020-06-27 | Stop reason: SDUPTHER

## 2020-05-20 ENCOUNTER — PATIENT MESSAGE (OUTPATIENT)
Dept: FAMILY MEDICINE | Facility: CLINIC | Age: 70
End: 2020-05-20

## 2020-05-21 ENCOUNTER — PATIENT MESSAGE (OUTPATIENT)
Dept: FAMILY MEDICINE | Facility: CLINIC | Age: 70
End: 2020-05-21

## 2020-05-26 ENCOUNTER — LAB VISIT (OUTPATIENT)
Dept: PRIMARY CARE CLINIC | Facility: CLINIC | Age: 70
End: 2020-05-26
Payer: MEDICARE

## 2020-05-26 DIAGNOSIS — R05.9 COUGH: Primary | ICD-10-CM

## 2020-05-26 PROCEDURE — U0003 INFECTIOUS AGENT DETECTION BY NUCLEIC ACID (DNA OR RNA); SEVERE ACUTE RESPIRATORY SYNDROME CORONAVIRUS 2 (SARS-COV-2) (CORONAVIRUS DISEASE [COVID-19]), AMPLIFIED PROBE TECHNIQUE, MAKING USE OF HIGH THROUGHPUT TECHNOLOGIES AS DESCRIBED BY CMS-2020-01-R: HCPCS

## 2020-05-26 NOTE — PROGRESS NOTES
2 patients identifiers verified. Specimen collected. Covid test complete. Patient tolerated well.

## 2020-05-27 LAB — SARS-COV-2 RNA RESP QL NAA+PROBE: NOT DETECTED

## 2020-06-11 ENCOUNTER — TELEPHONE (OUTPATIENT)
Dept: FAMILY MEDICINE | Facility: CLINIC | Age: 70
End: 2020-06-11

## 2020-06-11 DIAGNOSIS — E78.2 MIXED HYPERLIPIDEMIA: ICD-10-CM

## 2020-06-11 DIAGNOSIS — Z79.899 ENCOUNTER FOR LONG-TERM (CURRENT) USE OF OTHER MEDICATIONS: Primary | ICD-10-CM

## 2020-06-11 DIAGNOSIS — E03.9 HYPOTHYROIDISM, UNSPECIFIED TYPE: ICD-10-CM

## 2020-06-11 DIAGNOSIS — I10 ESSENTIAL HYPERTENSION: ICD-10-CM

## 2020-06-11 NOTE — TELEPHONE ENCOUNTER
----- Message from [facility], RT sent at 12/30/2019  9:45 AM CST -----  Regarding: lab due-ov 6/29  Hemant Zhang MD on 12/28/2019 at 1:01 PM CST  Call patient.  Lab work looks excellent.  Glucose is 107, the rest of the labs were entirely normal.  Cholesterol 165 and triglycerides 121.  Continue current medications, recheck in 6 months.  CBC CMP lipids TSH

## 2020-06-27 ENCOUNTER — PATIENT MESSAGE (OUTPATIENT)
Dept: FAMILY MEDICINE | Facility: CLINIC | Age: 70
End: 2020-06-27

## 2020-06-27 DIAGNOSIS — E78.5 HYPERLIPIDEMIA, UNSPECIFIED HYPERLIPIDEMIA TYPE: ICD-10-CM

## 2020-06-27 DIAGNOSIS — I10 ESSENTIAL HYPERTENSION: ICD-10-CM

## 2020-06-29 RX ORDER — PRAVASTATIN SODIUM 40 MG/1
40 TABLET ORAL DAILY
Qty: 90 TABLET | Refills: 1 | Status: SHIPPED | OUTPATIENT
Start: 2020-06-29 | End: 2020-07-13 | Stop reason: SDUPTHER

## 2020-06-29 RX ORDER — HYDROCHLOROTHIAZIDE 25 MG/1
25 TABLET ORAL DAILY
Qty: 90 TABLET | Refills: 1 | Status: SHIPPED | OUTPATIENT
Start: 2020-06-29 | End: 2020-07-13 | Stop reason: SDUPTHER

## 2020-07-06 ENCOUNTER — TELEPHONE (OUTPATIENT)
Dept: FAMILY MEDICINE | Facility: CLINIC | Age: 70
End: 2020-07-06

## 2020-07-06 NOTE — TELEPHONE ENCOUNTER
Left a message for patient to call back regarding his appointment with Dr. Zhang next week. Need to offer the patient a virtual or audio appointment.

## 2020-07-07 LAB
ALBUMIN SERPL-MCNC: 4.2 G/DL (ref 3.6–5.1)
ALBUMIN/GLOB SERPL: 1.4 (CALC) (ref 1–2.5)
ALP SERPL-CCNC: 83 U/L (ref 37–153)
ALT SERPL-CCNC: 17 U/L (ref 6–29)
AST SERPL-CCNC: 17 U/L (ref 10–35)
BASOPHILS # BLD AUTO: 62 CELLS/UL (ref 0–200)
BASOPHILS NFR BLD AUTO: 1 %
BILIRUB SERPL-MCNC: 0.5 MG/DL (ref 0.2–1.2)
BUN SERPL-MCNC: 17 MG/DL (ref 7–25)
BUN/CREAT SERPL: 17 (CALC) (ref 6–22)
CALCIUM SERPL-MCNC: 10.5 MG/DL (ref 8.6–10.4)
CHLORIDE SERPL-SCNC: 101 MMOL/L (ref 98–110)
CHOLEST SERPL-MCNC: 182 MG/DL
CHOLEST/HDLC SERPL: 2.5 (CALC)
CO2 SERPL-SCNC: 31 MMOL/L (ref 20–32)
CREAT SERPL-MCNC: 1 MG/DL (ref 0.5–0.99)
EOSINOPHIL # BLD AUTO: 192 CELLS/UL (ref 15–500)
EOSINOPHIL NFR BLD AUTO: 3.1 %
ERYTHROCYTE [DISTWIDTH] IN BLOOD BY AUTOMATED COUNT: 14.4 % (ref 11–15)
GFRSERPLBLD MDRD-ARVRAT: 58 ML/MIN/1.73M2
GLOBULIN SER CALC-MCNC: 3 G/DL (CALC) (ref 1.9–3.7)
GLUCOSE SERPL-MCNC: 113 MG/DL (ref 65–99)
HCT VFR BLD AUTO: 40.3 % (ref 35–45)
HDLC SERPL-MCNC: 73 MG/DL
HGB BLD-MCNC: 13.4 G/DL (ref 11.7–15.5)
LDLC SERPL CALC-MCNC: 86 MG/DL (CALC)
LYMPHOCYTES # BLD AUTO: 1990 CELLS/UL (ref 850–3900)
LYMPHOCYTES NFR BLD AUTO: 32.1 %
MCH RBC QN AUTO: 29.2 PG (ref 27–33)
MCHC RBC AUTO-ENTMCNC: 33.3 G/DL (ref 32–36)
MCV RBC AUTO: 87.8 FL (ref 80–100)
MONOCYTES # BLD AUTO: 422 CELLS/UL (ref 200–950)
MONOCYTES NFR BLD AUTO: 6.8 %
NEUTROPHILS # BLD AUTO: 3534 CELLS/UL (ref 1500–7800)
NEUTROPHILS NFR BLD AUTO: 57 %
NONHDLC SERPL-MCNC: 109 MG/DL (CALC)
PLATELET # BLD AUTO: 273 THOUSAND/UL (ref 140–400)
PMV BLD REES-ECKER: 10.4 FL (ref 7.5–12.5)
POTASSIUM SERPL-SCNC: 4.1 MMOL/L (ref 3.5–5.3)
PROT SERPL-MCNC: 7.2 G/DL (ref 6.1–8.1)
RBC # BLD AUTO: 4.59 MILLION/UL (ref 3.8–5.1)
SODIUM SERPL-SCNC: 144 MMOL/L (ref 135–146)
TRIGL SERPL-MCNC: 126 MG/DL
TSH SERPL-ACNC: 3.24 MIU/L (ref 0.4–4.5)
WBC # BLD AUTO: 6.2 THOUSAND/UL (ref 3.8–10.8)

## 2020-07-13 ENCOUNTER — OFFICE VISIT (OUTPATIENT)
Dept: FAMILY MEDICINE | Facility: CLINIC | Age: 70
End: 2020-07-13
Payer: MEDICARE

## 2020-07-13 VITALS
DIASTOLIC BLOOD PRESSURE: 80 MMHG | WEIGHT: 248 LBS | BODY MASS INDEX: 39.86 KG/M2 | SYSTOLIC BLOOD PRESSURE: 130 MMHG | HEART RATE: 100 BPM | HEIGHT: 66 IN

## 2020-07-13 DIAGNOSIS — Z78.0 MENOPAUSE: ICD-10-CM

## 2020-07-13 DIAGNOSIS — Z96.643 PRESENCE OF ARTIFICIAL HIP JOINT, BILATERAL: ICD-10-CM

## 2020-07-13 DIAGNOSIS — E06.3 AUTOIMMUNE THYROIDITIS: ICD-10-CM

## 2020-07-13 DIAGNOSIS — E78.5 HYPERLIPIDEMIA, UNSPECIFIED HYPERLIPIDEMIA TYPE: ICD-10-CM

## 2020-07-13 DIAGNOSIS — K21.9 GASTRO-ESOPHAGEAL REFLUX DISEASE WITHOUT ESOPHAGITIS: ICD-10-CM

## 2020-07-13 DIAGNOSIS — I10 ESSENTIAL HYPERTENSION: ICD-10-CM

## 2020-07-13 DIAGNOSIS — M75.41 ROTATOR CUFF IMPINGEMENT SYNDROME OF RIGHT SHOULDER: Primary | ICD-10-CM

## 2020-07-13 DIAGNOSIS — M17.0 PRIMARY OSTEOARTHRITIS OF BOTH KNEES: ICD-10-CM

## 2020-07-13 PROCEDURE — 99214 OFFICE O/P EST MOD 30 MIN: CPT | Mod: S$GLB,,, | Performed by: FAMILY MEDICINE

## 2020-07-13 PROCEDURE — 1159F MED LIST DOCD IN RCRD: CPT | Mod: S$GLB,,, | Performed by: FAMILY MEDICINE

## 2020-07-13 PROCEDURE — 3079F PR MOST RECENT DIASTOLIC BLOOD PRESSURE 80-89 MM HG: ICD-10-PCS | Mod: S$GLB,,, | Performed by: FAMILY MEDICINE

## 2020-07-13 PROCEDURE — 1159F PR MEDICATION LIST DOCUMENTED IN MEDICAL RECORD: ICD-10-PCS | Mod: S$GLB,,, | Performed by: FAMILY MEDICINE

## 2020-07-13 PROCEDURE — 99214 PR OFFICE/OUTPT VISIT, EST, LEVL IV, 30-39 MIN: ICD-10-PCS | Mod: S$GLB,,, | Performed by: FAMILY MEDICINE

## 2020-07-13 PROCEDURE — 3075F PR MOST RECENT SYSTOLIC BLOOD PRESS GE 130-139MM HG: ICD-10-PCS | Mod: S$GLB,,, | Performed by: FAMILY MEDICINE

## 2020-07-13 PROCEDURE — 3075F SYST BP GE 130 - 139MM HG: CPT | Mod: S$GLB,,, | Performed by: FAMILY MEDICINE

## 2020-07-13 PROCEDURE — 3008F PR BODY MASS INDEX (BMI) DOCUMENTED: ICD-10-PCS | Mod: S$GLB,,, | Performed by: FAMILY MEDICINE

## 2020-07-13 PROCEDURE — 3079F DIAST BP 80-89 MM HG: CPT | Mod: S$GLB,,, | Performed by: FAMILY MEDICINE

## 2020-07-13 PROCEDURE — 3008F BODY MASS INDEX DOCD: CPT | Mod: S$GLB,,, | Performed by: FAMILY MEDICINE

## 2020-07-13 RX ORDER — AMLODIPINE BESYLATE 5 MG/1
5 TABLET ORAL DAILY
Qty: 90 TABLET | Refills: 1 | Status: SHIPPED | OUTPATIENT
Start: 2020-07-13 | End: 2021-01-13 | Stop reason: SDUPTHER

## 2020-07-13 RX ORDER — ESTRADIOL 0.5 MG/1
0.25 TABLET ORAL DAILY
Qty: 90 TABLET | Refills: 1 | Status: CANCELLED | OUTPATIENT
Start: 2020-07-13

## 2020-07-13 RX ORDER — HYDROGEN PEROXIDE 3 %
20 SOLUTION, NON-ORAL MISCELLANEOUS
Qty: 90 CAPSULE | Refills: 1 | Status: CANCELLED | OUTPATIENT
Start: 2020-07-13

## 2020-07-13 RX ORDER — HYDROCHLOROTHIAZIDE 25 MG/1
25 TABLET ORAL DAILY
Qty: 90 TABLET | Refills: 1 | Status: SHIPPED | OUTPATIENT
Start: 2020-07-13 | End: 2021-01-13 | Stop reason: SDUPTHER

## 2020-07-13 RX ORDER — CYANOCOBALAMIN (VITAMIN B-12) 1000 MCG
TABLET, EXTENDED RELEASE ORAL
COMMUNITY

## 2020-07-13 RX ORDER — PRAVASTATIN SODIUM 40 MG/1
40 TABLET ORAL DAILY
Qty: 90 TABLET | Refills: 1 | Status: SHIPPED | OUTPATIENT
Start: 2020-07-13 | End: 2021-01-13 | Stop reason: SDUPTHER

## 2020-07-13 RX ORDER — LEVOTHYROXINE SODIUM 25 UG/1
TABLET ORAL
COMMUNITY
Start: 2020-06-22

## 2020-07-13 RX ORDER — MULTIVIT WITH MINERALS/HERBS
1 TABLET ORAL DAILY
COMMUNITY

## 2020-07-13 NOTE — PROGRESS NOTES
SUBJECTIVE:    Patient ID: Ligia Krishnan is a 70 y.o. female.    Chief Complaint: Hypertension (brought bottles ac )    This 70-year-old female has a history of hypertension.  She is on amlodipine 5 mg hs and HCTZ 25 mg daily.  She monitors her blood pressure at home and has noticed she has been under increased stress lately due to the COVID virus crisis.  She occasionally feels orthostatic dizziness when getting up too quickly from a chair.    Patient complains of left knee intermittent pain and bilateral shoulder aching.  She takes Aleve p.r.n. for the pains in her left.  She has been trying intermittent fasting to help reduce her weight as well as reduce her blood pressure.  She has lost 4 lb recently.  He has had bilateral hip replacements and denies any pain in these new hips.    2016-colonoscopy done with Dr. Gonzalez, RTC 7 years.      Telephone on 06/11/2020   Component Date Value Ref Range Status    WBC 07/06/2020 6.2  3.8 - 10.8 Thousand/uL Final    RBC 07/06/2020 4.59  3.80 - 5.10 Million/uL Final    Hemoglobin 07/06/2020 13.4  11.7 - 15.5 g/dL Final    Hematocrit 07/06/2020 40.3  35.0 - 45.0 % Final    Mean Corpuscular Volume 07/06/2020 87.8  80.0 - 100.0 fL Final    Mean Corpuscular Hemoglobin 07/06/2020 29.2  27.0 - 33.0 pg Final    Mean Corpuscular Hemoglobin Conc 07/06/2020 33.3  32.0 - 36.0 g/dL Final    RDW 07/06/2020 14.4  11.0 - 15.0 % Final    Platelets 07/06/2020 273  140 - 400 Thousand/uL Final    MPV 07/06/2020 10.4  7.5 - 12.5 fL Final    Neutrophils Absolute 07/06/2020 3,534  1,500 - 7,800 cells/uL Final    Lymph # 07/06/2020 1,990  850 - 3,900 cells/uL Final    Mono # 07/06/2020 422  200 - 950 cells/uL Final    Eos # 07/06/2020 192  15 - 500 cells/uL Final    Baso # 07/06/2020 62  0 - 200 cells/uL Final    Neutrophils Relative 07/06/2020 57  % Final    Lymph% 07/06/2020 32.1  % Final    Mono% 07/06/2020 6.8  % Final    Eosinophil% 07/06/2020 3.1  % Final     Basophil% 07/06/2020 1.0  % Final    Glucose 07/06/2020 113* 65 - 99 mg/dL Final    BUN, Bld 07/06/2020 17  7 - 25 mg/dL Final    Creatinine 07/06/2020 1.00* 0.50 - 0.99 mg/dL Final    eGFR if non African American 07/06/2020 58* > OR = 60 mL/min/1.73m2 Final    eGFR if African American 07/06/2020 67  > OR = 60 mL/min/1.73m2 Final    BUN/Creatinine Ratio 07/06/2020 17  6 - 22 (calc) Final    Sodium 07/06/2020 144  135 - 146 mmol/L Final    Potassium 07/06/2020 4.1  3.5 - 5.3 mmol/L Final    Chloride 07/06/2020 101  98 - 110 mmol/L Final    CO2 07/06/2020 31  20 - 32 mmol/L Final    Calcium 07/06/2020 10.5* 8.6 - 10.4 mg/dL Final    Total Protein 07/06/2020 7.2  6.1 - 8.1 g/dL Final    Albumin 07/06/2020 4.2  3.6 - 5.1 g/dL Final    Globulin, Total 07/06/2020 3.0  1.9 - 3.7 g/dL (calc) Final    Albumin/Globulin Ratio 07/06/2020 1.4  1.0 - 2.5 (calc) Final    Total Bilirubin 07/06/2020 0.5  0.2 - 1.2 mg/dL Final    Alkaline Phosphatase 07/06/2020 83  37 - 153 U/L Final    AST 07/06/2020 17  10 - 35 U/L Final    ALT 07/06/2020 17  6 - 29 U/L Final    Cholesterol 07/06/2020 182  <200 mg/dL Final    HDL 07/06/2020 73  > OR = 50 mg/dL Final    Triglycerides 07/06/2020 126  <150 mg/dL Final    LDL Cholesterol 07/06/2020 86  mg/dL (calc) Final    Hdl/Cholesterol Ratio 07/06/2020 2.5  <5.0 (calc) Final    Non HDL Chol. (LDL+VLDL) 07/06/2020 109  <130 mg/dL (calc) Final    TSH 07/06/2020 3.24  0.40 - 4.50 mIU/L Final   Lab Visit on 05/26/2020   Component Date Value Ref Range Status    SARS-CoV2 (COVID-19) Qualitative P* 05/26/2020 Not Detected  Not Detected Final       Past Medical History:   Diagnosis Date    Chronic renal insufficiency, stage III (moderate) 5/16/2017    Esophageal reflux     Hyperlipidemia     Hypertension     OA (osteoarthritis) of hip     bilateral     Past Surgical History:   Procedure Laterality Date    APPENDECTOMY  1957    BREAST BIOPSY      BREAST CYST ASPIRATION       CATARACT EXTRACTION Left 10/2018    COLONOSCOPY  2016    Dr. jose-RT 7 years    JOINT REPLACEMENT Right 6/13/13    (R) DAHPNEY    JOINT REPLACEMENT Left 10/18/12    (L) DAPHNEY     Family History   Problem Relation Age of Onset    Melanoma Neg Hx     Lupus Neg Hx     Eczema Neg Hx     Psoriasis Neg Hx        Marital Status:   Alcohol History:  reports current alcohol use.  Tobacco History:  reports that she has never smoked. She has never used smokeless tobacco.  Drug History:  has no history on file for drug.    Review of patient's allergies indicates:   Allergen Reactions    Morphine Nausea And Vomiting       Current Outpatient Medications:     amLODIPine (NORVASC) 5 MG tablet, Take 1 tablet (5 mg total) by mouth once daily., Disp: 90 tablet, Rfl: 1    APPLE CIDER VINEGAR ORAL, Take by mouth., Disp: , Rfl:     b complex vitamins tablet, Take 1 tablet by mouth once daily., Disp: , Rfl:     biotin 10,000 mcg Cap, Take 1 capsule by mouth once daily. , Disp: , Rfl:     calcium carbonate/vitamin D2 (LIQUID CALCIUM 600-VITAMIN D ORAL), , Disp: , Rfl:     esomeprazole (NEXIUM) 20 MG capsule, Take 20 mg by mouth before breakfast., Disp: , Rfl:     estradiol (ESTRACE) 0.5 MG tablet, Take 0.25 mg by mouth once daily. , Disp: , Rfl: 0    FLAXSEED OIL ORAL, Take by mouth., Disp: , Rfl:     hydroCHLOROthiazide (HYDRODIURIL) 25 MG tablet, Take 1 tablet (25 mg total) by mouth once daily., Disp: 90 tablet, Rfl: 1    LACTOBACILLUS ACIDOPHILUS (PROBIOTIC ORAL), Take by mouth., Disp: , Rfl:     melatonin 10 mg Cap, Take by mouth every evening., Disp: , Rfl:     multivitamin capsule, Take 1 capsule by mouth daily as needed. , Disp: , Rfl:     pravastatin (PRAVACHOL) 40 MG tablet, Take 1 tablet (40 mg total) by mouth once daily., Disp: 90 tablet, Rfl: 1    progesterone (PROMETRIUM) 100 MG capsule, Take 100 mg by mouth once daily. , Disp: , Rfl: 0    selenium 200 mcg Cap, Take by mouth., Disp: , Rfl:      "SYNTHROID 25 mcg tablet, , Disp: , Rfl:     Review of Systems   Constitutional: Negative for appetite change, chills, fatigue, fever and unexpected weight change.   HENT: Negative for congestion, ear pain, sinus pain, sore throat and trouble swallowing.    Eyes: Negative for pain, discharge and visual disturbance.   Respiratory: Negative for apnea, cough, shortness of breath and wheezing.    Cardiovascular: Negative for chest pain, palpitations and leg swelling.   Gastrointestinal: Negative for abdominal pain, blood in stool, constipation, diarrhea, nausea and vomiting.   Endocrine: Negative for heat intolerance, polydipsia and polyuria.   Genitourinary: Negative for difficulty urinating, dyspareunia, dysuria, frequency, hematuria and menstrual problem.   Musculoskeletal: Negative for arthralgias, back pain, gait problem, joint swelling and myalgias.        Left knee pains  X 3-4  Mo, shoulders  Ache  Bilat...   Allergic/Immunologic: Negative for environmental allergies, food allergies and immunocompromised state.   Neurological: Negative for dizziness, tremors, seizures, numbness and headaches.   Psychiatric/Behavioral: Negative for behavioral problems, confusion, hallucinations and suicidal ideas. The patient is not nervous/anxious.           Objective:      Vitals:    07/13/20 1435 07/13/20 1436 07/13/20 2015   BP: (!) 148/108 (!) 150/110 130/80   Pulse: 100     Weight: 112.5 kg (248 lb)     Height: 5' 6" (1.676 m)       Body mass index is 40.03 kg/m².  Physical Exam  Vitals signs and nursing note reviewed.   Constitutional:       Appearance: She is well-developed. She is obese.   HENT:      Head: Normocephalic and atraumatic.      Right Ear: External ear normal.      Left Ear: External ear normal.      Nose: Nose normal.   Eyes:      Pupils: Pupils are equal, round, and reactive to light.   Neck:      Musculoskeletal: Normal range of motion and neck supple.      Thyroid: No thyromegaly.      Vascular: No carotid " bruit.   Cardiovascular:      Rate and Rhythm: Normal rate and regular rhythm.      Heart sounds: Normal heart sounds. No murmur.   Pulmonary:      Effort: Pulmonary effort is normal.      Breath sounds: Normal breath sounds. No wheezing or rales.   Abdominal:      General: Bowel sounds are normal. There is no distension.      Palpations: Abdomen is soft.      Tenderness: There is no abdominal tenderness.   Musculoskeletal: Normal range of motion.         General: No tenderness or deformity.      Lumbar back: Normal. She exhibits no pain and no spasm.      Comments: Bends 90 degrees at  waist, shoulders have a painful arc right greater than left but have good range of motion.  Knees are good range of motion without crepitance.  She walks with no limp.  No pitting edema is seen in the lower extremities   Lymphadenopathy:      Cervical: No cervical adenopathy.   Skin:     General: Skin is warm and dry.      Findings: No rash.   Neurological:      Mental Status: She is alert and oriented to person, place, and time.      Cranial Nerves: No cranial nerve deficit.      Coordination: Coordination normal.   Psychiatric:         Behavior: Behavior normal.         Thought Content: Thought content normal.         Judgment: Judgment normal.           Assessment:       1. Rotator cuff impingement syndrome of right shoulder    2. Menopause    3. Hyperlipidemia, unspecified hyperlipidemia type    4. Essential hypertension    5. Autoimmune thyroiditis    6. Gastro-esophageal reflux disease without esophagitis    7. Presence of artificial hip joint, bilateral    8. Primary osteoarthritis of both knees         Plan:       Rotator cuff impingement syndrome of right shoulder   Patient has symptoms of rotator cuff impingement.  Recommended range of motion exercises,.  Aleve p.r.n.  Menopause  -     DXA Bone Density Spine And Hip; Future; Expected date: 07/13/2020  DEXA scan ordered  Hyperlipidemia, unspecified hyperlipidemia type  -      pravastatin (PRAVACHOL) 40 MG tablet; Take 1 tablet (40 mg total) by mouth once daily.  Dispense: 90 tablet; Refill: 1  Lipid panel at goal on current regimen.  Continue pravastatin  Essential hypertension  -     hydroCHLOROthiazide (HYDRODIURIL) 25 MG tablet; Take 1 tablet (25 mg total) by mouth once daily.  Dispense: 90 tablet; Refill: 1  -     amLODIPine (NORVASC) 5 MG tablet; Take 1 tablet (5 mg total) by mouth once daily.  Dispense: 90 tablet; Refill: 1  Blood pressure adequately controlled on this regimen  Autoimmune thyroiditis  Sees endocrinologist for further treat  Gastro-esophageal reflux disease without esophagitis    Presence of artificial hip joint, bilateral  No pain admitted to for her hips  Primary osteoarthritis of both knees  Conservative treatment requested    Follow up in about 6 months (around 1/13/2021).

## 2020-08-26 ENCOUNTER — HOSPITAL ENCOUNTER (OUTPATIENT)
Dept: RADIOLOGY | Facility: HOSPITAL | Age: 70
Discharge: HOME | End: 2020-08-26
Attending: FAMILY MEDICINE
Payer: MEDICARE

## 2020-08-26 DIAGNOSIS — Z78.0 MENOPAUSE: ICD-10-CM

## 2020-08-26 PROCEDURE — 77080 DXA BONE DENSITY AXIAL: CPT | Mod: TC,PO

## 2020-09-01 ENCOUNTER — TELEPHONE (OUTPATIENT)
Dept: FAMILY MEDICINE | Facility: CLINIC | Age: 70
End: 2020-09-01

## 2020-09-01 NOTE — TELEPHONE ENCOUNTER
Spoke to patient who understood the message/ba   Triage Chief Complaint:    Chest Pain    [place]:  Carolin Pollard is a 27 y.o. female that presents  To the ED Complaining of Chest Pain. Her Father Carol Santoro Had a Heart Attack She Developed Some Pain Was Concerned Wanted to Come to the Hospital Patient's a 33-year-old Overweight Does Not Smoke As a History Polycystic Kidney Disease Does Not Have Known Hyperlipidemia. She Tells Me His Pain Is Worse When She Lays on Her Right Side. No Cough. No Sputum Production or Hemoptysis. She Has No Associative Back Discomfort. It Is Not Exertional and Occurred When She Was in Bed and Simply Rolled over.     Past Medical History:   Diagnosis Date    Fatty liver     Gestational hypertension     Polycystic kidney disease     Scoliosis      Past Surgical History:   Procedure Laterality Date    CHOLECYSTECTOMY      TONSILLECTOMY      TUBAL LIGATION      1-    WISDOM TOOTH EXTRACTION       Family History   Problem Relation Age of Onset    Kidney Disease Father     High Blood Pressure Father     Heart Disease Maternal Grandfather      Social History     Socioeconomic History    Marital status: Single     Spouse name: Not on file    Number of children: Not on file    Years of education: Not on file    Highest education level: Not on file   Occupational History    Not on file   Social Needs    Financial resource strain: Not on file    Food insecurity:     Worry: Not on file     Inability: Not on file    Transportation needs:     Medical: Not on file     Non-medical: Not on file   Tobacco Use    Smoking status: Never Smoker    Smokeless tobacco: Never Used   Substance and Sexual Activity    Alcohol use: No    Drug use: No    Sexual activity: Not on file   Lifestyle    Physical activity:     Days per week: Not on file     Minutes per session: Not on file    Stress: Not on file   Relationships    Social connections:     Talks on phone: Not on file     Gets together: Not on file     Attends

## 2020-09-01 NOTE — TELEPHONE ENCOUNTER
----- Message from Hemant Zhang MD sent at 8/30/2020 11:26 AM CDT -----  Call patient.  DEXA scan shows normal bone density and hips and spine.  Continue calcium plus D vitamins

## 2020-11-06 DIAGNOSIS — Z12.31 ENCOUNTER FOR SCREENING MAMMOGRAM FOR MALIGNANT NEOPLASM OF BREAST: Primary | ICD-10-CM

## 2020-12-17 ENCOUNTER — OFFICE VISIT (OUTPATIENT)
Dept: DERMATOLOGY | Facility: CLINIC | Age: 70
End: 2020-12-17
Payer: MEDICARE

## 2020-12-17 DIAGNOSIS — L81.4 SOLAR LENTIGO: ICD-10-CM

## 2020-12-17 DIAGNOSIS — D22.9 MULTIPLE BENIGN NEVI: ICD-10-CM

## 2020-12-17 DIAGNOSIS — L30.9 DERMATITIS: Primary | ICD-10-CM

## 2020-12-17 DIAGNOSIS — L82.1 SEBORRHEIC KERATOSES: ICD-10-CM

## 2020-12-17 DIAGNOSIS — L73.8 SEBACEOUS HYPERPLASIA OF FACE: ICD-10-CM

## 2020-12-17 PROCEDURE — 99214 OFFICE O/P EST MOD 30 MIN: CPT | Mod: S$GLB,,, | Performed by: DERMATOLOGY

## 2020-12-17 PROCEDURE — 99999 PR PBB SHADOW E&M-EST. PATIENT-LVL III: CPT | Mod: PBBFAC,,, | Performed by: DERMATOLOGY

## 2020-12-17 PROCEDURE — 1101F PT FALLS ASSESS-DOCD LE1/YR: CPT | Mod: CPTII,S$GLB,, | Performed by: DERMATOLOGY

## 2020-12-17 PROCEDURE — 1159F PR MEDICATION LIST DOCUMENTED IN MEDICAL RECORD: ICD-10-PCS | Mod: S$GLB,,, | Performed by: DERMATOLOGY

## 2020-12-17 PROCEDURE — 3288F FALL RISK ASSESSMENT DOCD: CPT | Mod: CPTII,S$GLB,, | Performed by: DERMATOLOGY

## 2020-12-17 PROCEDURE — 1126F AMNT PAIN NOTED NONE PRSNT: CPT | Mod: S$GLB,,, | Performed by: DERMATOLOGY

## 2020-12-17 PROCEDURE — 99214 PR OFFICE/OUTPT VISIT, EST, LEVL IV, 30-39 MIN: ICD-10-PCS | Mod: S$GLB,,, | Performed by: DERMATOLOGY

## 2020-12-17 PROCEDURE — 1159F MED LIST DOCD IN RCRD: CPT | Mod: S$GLB,,, | Performed by: DERMATOLOGY

## 2020-12-17 PROCEDURE — 1126F PR PAIN SEVERITY QUANTIFIED, NO PAIN PRESENT: ICD-10-PCS | Mod: S$GLB,,, | Performed by: DERMATOLOGY

## 2020-12-17 PROCEDURE — 1101F PR PT FALLS ASSESS DOC 0-1 FALLS W/OUT INJ PAST YR: ICD-10-PCS | Mod: CPTII,S$GLB,, | Performed by: DERMATOLOGY

## 2020-12-17 PROCEDURE — 99999 PR PBB SHADOW E&M-EST. PATIENT-LVL III: ICD-10-PCS | Mod: PBBFAC,,, | Performed by: DERMATOLOGY

## 2020-12-17 PROCEDURE — 3288F PR FALLS RISK ASSESSMENT DOCUMENTED: ICD-10-PCS | Mod: CPTII,S$GLB,, | Performed by: DERMATOLOGY

## 2020-12-17 RX ORDER — TRIAMCINOLONE ACETONIDE 1 MG/G
CREAM TOPICAL
Qty: 30 G | Refills: 3 | Status: SHIPPED | OUTPATIENT
Start: 2020-12-17 | End: 2022-08-06

## 2020-12-17 RX ORDER — CLINDAMYCIN PHOSPHATE 10 UG/ML
LOTION TOPICAL
Qty: 60 ML | Refills: 1 | Status: SHIPPED | OUTPATIENT
Start: 2020-12-17 | End: 2022-08-06

## 2020-12-17 RX ORDER — OXYBUTYNIN CHLORIDE 10 MG/1
10 TABLET, EXTENDED RELEASE ORAL DAILY
COMMUNITY

## 2020-12-17 NOTE — PROGRESS NOTES
"  Subjective:       Patient ID:  Ligia Krishnan is a 70 y.o. female who presents for   Chief Complaint   Patient presents with    Skin Check     TBSE     LOV: 10/21/19    Patient here today for annual skin check,TBSE.   C/o spot to left side of neck x 5 months. Red and itches occasionally. Patient says" she has been picking". OTC acne topical applied.     No PHX NMSC  Mother with AK sister with NMSC          Review of Systems   Constitutional: Negative for fever, chills, weight loss, weight gain and night sweats.   Respiratory: Negative for cough and shortness of breath.    Gastrointestinal: Negative for nausea and vomiting.   Skin: Positive for daily sunscreen use (make up and moisturizer), activity-related sunscreen use and wears hat.   Hematologic/Lymphatic: Does not bruise/bleed easily.        Objective:    Physical Exam   Constitutional: She appears well-developed and well-nourished. No distress.   Neurological: She is alert and oriented to person, place, and time. She is not disoriented.   Psychiatric: She has a normal mood and affect.   Skin:   Areas Examined (abnormalities noted in diagram):   Scalp / Hair Palpated and Inspected  Head / Face Inspection Performed  Neck Inspection Performed  Chest / Axilla Inspection Performed  Abdomen Inspection Performed  Genitals / Buttocks / Groin Inspection Performed  Back Inspection Performed  RUE Inspected  LUE Inspection Performed  RLE Inspected  LLE Inspection Performed  Nails and Digits Inspection Performed                           Diagram Legend     Erythematous scaling macule/papule c/w actinic keratosis       Vascular papule c/w angioma      Pigmented verrucoid papule/plaque c/w seborrheic keratosis      Yellow umbilicated papule c/w sebaceous hyperplasia      Irregularly shaped tan macule c/w lentigo     1-2 mm smooth white papules consistent with Milia      Movable subcutaneous cyst with punctum c/w epidermal inclusion cyst      Subcutaneous movable cyst " c/w pilar cyst      Firm pink to brown papule c/w dermatofibroma      Pedunculated fleshy papule(s) c/w skin tag(s)      Evenly pigmented macule c/w junctional nevus     Mildly variegated pigmented, slightly irregular-bordered macule c/w mildly atypical nevus      Flesh colored to evenly pigmented papule c/w intradermal nevus       Pink pearly papule/plaque c/w basal cell carcinoma      Erythematous hyperkeratotic cursted plaque c/w SCC      Surgical scar with no sign of skin cancer recurrence      Open and closed comedones      Inflammatory papules and pustules      Verrucoid papule consistent consistent with wart     Erythematous eczematous patches and plaques     Dystrophic onycholytic nail with subungual debris c/w onychomycosis     Umbilicated papule    Erythematous-base heme-crusted tan verrucoid plaque consistent with inflamed seborrheic keratosis     Erythematous Silvery Scaling Plaque c/w Psoriasis     See annotation      Assessment / Plan:        Dermatitis  -     triamcinolone acetonide 0.1% (KENALOG) 0.1 % cream; Apply thin film to AA left neck bid PRN itchy flare  Dispense: 30 g; Refill: 3  -     clindamycin (CLEOCIN T) 1 % lotion; Apply thin film to AA left neck BID  Dispense: 60 mL; Refill: 1  Element of excoriation, superinfection and contact  Avoid manipulation (pateint admits to scratching)    Seborrheic keratoses  These are benign inherited growths without a malignant potential. Reassurance given to patient. No treatment is necessary.     Solar lentigo  This is a benign hyperpigmented sun induced lesion. Daily sun protection will reduce the number of new lesions. Treatment of these benign lesions are considered cosmetic.    Multiple benign nevi  Monitor for new mole or moles that are becoming bigger, darker, irritated, or developing irregular borders.     Sebaceous hyperplasia of face  This is a common condition representing benign enlargement of the sebaceous lobule. It typically occurs in  adulthood. Reassurance given to patient.     Patient instructed in importance in daily sun protection of at least spf 30. Mineral sunscreen ingredients preferred (Zinc +/- Titanium).   Recommend Elta MD for daily use on face and neck.  Patient encouraged to wear hat for all outdoor exposure.   Also discussed sun avoidance and use of protective clothing.               No follow-ups on file.

## 2020-12-30 ENCOUNTER — PATIENT MESSAGE (OUTPATIENT)
Dept: FAMILY MEDICINE | Facility: CLINIC | Age: 70
End: 2020-12-30

## 2020-12-30 DIAGNOSIS — E78.5 HYPERLIPIDEMIA, UNSPECIFIED HYPERLIPIDEMIA TYPE: ICD-10-CM

## 2020-12-30 DIAGNOSIS — I10 ESSENTIAL HYPERTENSION: ICD-10-CM

## 2020-12-30 DIAGNOSIS — Z79.899 ENCOUNTER FOR LONG-TERM (CURRENT) USE OF OTHER MEDICATIONS: ICD-10-CM

## 2020-12-30 DIAGNOSIS — Z00.00 ROUTINE GENERAL MEDICAL EXAMINATION AT A HEALTH CARE FACILITY: Primary | ICD-10-CM

## 2021-01-06 LAB
ALBUMIN SERPL-MCNC: 4.5 G/DL (ref 3.6–5.1)
ALBUMIN/GLOB SERPL: 1.6 (CALC) (ref 1–2.5)
ALP SERPL-CCNC: 86 U/L (ref 37–153)
ALT SERPL-CCNC: 19 U/L (ref 6–29)
AST SERPL-CCNC: 19 U/L (ref 10–35)
BASOPHILS # BLD AUTO: 58 CELLS/UL (ref 0–200)
BASOPHILS NFR BLD AUTO: 0.8 %
BILIRUB SERPL-MCNC: 0.6 MG/DL (ref 0.2–1.2)
BUN SERPL-MCNC: 17 MG/DL (ref 7–25)
BUN/CREAT SERPL: ABNORMAL (CALC) (ref 6–22)
CALCIUM SERPL-MCNC: 9.9 MG/DL (ref 8.6–10.4)
CHLORIDE SERPL-SCNC: 99 MMOL/L (ref 98–110)
CHOLEST SERPL-MCNC: 192 MG/DL
CHOLEST/HDLC SERPL: 2.7 (CALC)
CO2 SERPL-SCNC: 32 MMOL/L (ref 20–32)
CREAT SERPL-MCNC: 0.94 MG/DL (ref 0.5–0.99)
EOSINOPHIL # BLD AUTO: 197 CELLS/UL (ref 15–500)
EOSINOPHIL NFR BLD AUTO: 2.7 %
ERYTHROCYTE [DISTWIDTH] IN BLOOD BY AUTOMATED COUNT: 14.1 % (ref 11–15)
GFRSERPLBLD MDRD-ARVRAT: 62 ML/MIN/1.73M2
GLOBULIN SER CALC-MCNC: 2.8 G/DL (CALC) (ref 1.9–3.7)
GLUCOSE SERPL-MCNC: 118 MG/DL (ref 65–99)
HCT VFR BLD AUTO: 41.2 % (ref 35–45)
HDLC SERPL-MCNC: 71 MG/DL
HGB BLD-MCNC: 13.6 G/DL (ref 11.7–15.5)
LDLC SERPL CALC-MCNC: 95 MG/DL (CALC)
LYMPHOCYTES # BLD AUTO: 2876 CELLS/UL (ref 850–3900)
LYMPHOCYTES NFR BLD AUTO: 39.4 %
MCH RBC QN AUTO: 28.9 PG (ref 27–33)
MCHC RBC AUTO-ENTMCNC: 33 G/DL (ref 32–36)
MCV RBC AUTO: 87.7 FL (ref 80–100)
MONOCYTES # BLD AUTO: 489 CELLS/UL (ref 200–950)
MONOCYTES NFR BLD AUTO: 6.7 %
NEUTROPHILS # BLD AUTO: 3679 CELLS/UL (ref 1500–7800)
NEUTROPHILS NFR BLD AUTO: 50.4 %
NONHDLC SERPL-MCNC: 121 MG/DL (CALC)
PLATELET # BLD AUTO: 282 THOUSAND/UL (ref 140–400)
PMV BLD REES-ECKER: 10 FL (ref 7.5–12.5)
POTASSIUM SERPL-SCNC: 4.1 MMOL/L (ref 3.5–5.3)
PROT SERPL-MCNC: 7.3 G/DL (ref 6.1–8.1)
RBC # BLD AUTO: 4.7 MILLION/UL (ref 3.8–5.1)
SODIUM SERPL-SCNC: 139 MMOL/L (ref 135–146)
TRIGL SERPL-MCNC: 154 MG/DL
TSH SERPL-ACNC: 3.31 MIU/L (ref 0.4–4.5)
WBC # BLD AUTO: 7.3 THOUSAND/UL (ref 3.8–10.8)

## 2021-01-09 LAB
ALBUMIN/CREAT UR: 5 MCG/MG CREAT
APPEARANCE UR: CLEAR
BACTERIA #/AREA URNS HPF: ABNORMAL /HPF
BACTERIA UR CULT: ABNORMAL
BILIRUB UR QL STRIP: NEGATIVE
COLOR UR: YELLOW
CREAT UR-MCNC: 79 MG/DL (ref 20–275)
GLUCOSE UR QL STRIP: NEGATIVE
HGB UR QL STRIP: NEGATIVE
HYALINE CASTS #/AREA URNS LPF: ABNORMAL /LPF
KETONES UR QL STRIP: NEGATIVE
LEUKOCYTE ESTERASE UR QL STRIP: ABNORMAL
MICROALBUMIN UR-MCNC: 0.4 MG/DL
NITRITE UR QL STRIP: NEGATIVE
PH UR STRIP: 7 [PH] (ref 5–8)
PROT UR QL STRIP: NEGATIVE
RBC #/AREA URNS HPF: ABNORMAL /HPF
SP GR UR STRIP: 1.02 (ref 1–1.03)
SQUAMOUS #/AREA URNS HPF: ABNORMAL /HPF
WBC #/AREA URNS HPF: ABNORMAL /HPF

## 2021-01-11 ENCOUNTER — TELEPHONE (OUTPATIENT)
Dept: FAMILY MEDICINE | Facility: CLINIC | Age: 71
End: 2021-01-11

## 2021-01-13 ENCOUNTER — OFFICE VISIT (OUTPATIENT)
Dept: FAMILY MEDICINE | Facility: CLINIC | Age: 71
End: 2021-01-13
Payer: MEDICARE

## 2021-01-13 VITALS
DIASTOLIC BLOOD PRESSURE: 82 MMHG | HEART RATE: 104 BPM | HEIGHT: 65 IN | BODY MASS INDEX: 40.48 KG/M2 | WEIGHT: 243 LBS | SYSTOLIC BLOOD PRESSURE: 135 MMHG

## 2021-01-13 DIAGNOSIS — M17.0 PRIMARY OSTEOARTHRITIS OF BOTH KNEES: ICD-10-CM

## 2021-01-13 DIAGNOSIS — E78.5 HYPERLIPIDEMIA, UNSPECIFIED HYPERLIPIDEMIA TYPE: ICD-10-CM

## 2021-01-13 DIAGNOSIS — M75.41 ROTATOR CUFF IMPINGEMENT SYNDROME OF RIGHT SHOULDER: ICD-10-CM

## 2021-01-13 DIAGNOSIS — E06.3 AUTOIMMUNE THYROIDITIS: ICD-10-CM

## 2021-01-13 DIAGNOSIS — Z96.643 PRESENCE OF ARTIFICIAL HIP JOINT, BILATERAL: ICD-10-CM

## 2021-01-13 DIAGNOSIS — K21.9 GASTRO-ESOPHAGEAL REFLUX DISEASE WITHOUT ESOPHAGITIS: ICD-10-CM

## 2021-01-13 DIAGNOSIS — E03.9 ACQUIRED HYPOTHYROIDISM: ICD-10-CM

## 2021-01-13 DIAGNOSIS — N39.41 URGE URINARY INCONTINENCE: ICD-10-CM

## 2021-01-13 DIAGNOSIS — I10 ESSENTIAL HYPERTENSION: Primary | ICD-10-CM

## 2021-01-13 PROCEDURE — 1159F PR MEDICATION LIST DOCUMENTED IN MEDICAL RECORD: ICD-10-PCS | Mod: S$GLB,,, | Performed by: FAMILY MEDICINE

## 2021-01-13 PROCEDURE — 99214 PR OFFICE/OUTPT VISIT, EST, LEVL IV, 30-39 MIN: ICD-10-PCS | Mod: S$GLB,,, | Performed by: FAMILY MEDICINE

## 2021-01-13 PROCEDURE — 3079F PR MOST RECENT DIASTOLIC BLOOD PRESSURE 80-89 MM HG: ICD-10-PCS | Mod: S$GLB,,, | Performed by: FAMILY MEDICINE

## 2021-01-13 PROCEDURE — 99214 OFFICE O/P EST MOD 30 MIN: CPT | Mod: S$GLB,,, | Performed by: FAMILY MEDICINE

## 2021-01-13 PROCEDURE — 3075F PR MOST RECENT SYSTOLIC BLOOD PRESS GE 130-139MM HG: ICD-10-PCS | Mod: S$GLB,,, | Performed by: FAMILY MEDICINE

## 2021-01-13 PROCEDURE — 3008F PR BODY MASS INDEX (BMI) DOCUMENTED: ICD-10-PCS | Mod: S$GLB,,, | Performed by: FAMILY MEDICINE

## 2021-01-13 PROCEDURE — 3075F SYST BP GE 130 - 139MM HG: CPT | Mod: S$GLB,,, | Performed by: FAMILY MEDICINE

## 2021-01-13 PROCEDURE — 3079F DIAST BP 80-89 MM HG: CPT | Mod: S$GLB,,, | Performed by: FAMILY MEDICINE

## 2021-01-13 PROCEDURE — 1159F MED LIST DOCD IN RCRD: CPT | Mod: S$GLB,,, | Performed by: FAMILY MEDICINE

## 2021-01-13 PROCEDURE — 3008F BODY MASS INDEX DOCD: CPT | Mod: S$GLB,,, | Performed by: FAMILY MEDICINE

## 2021-01-13 RX ORDER — PRAVASTATIN SODIUM 40 MG/1
40 TABLET ORAL DAILY
Qty: 90 TABLET | Refills: 1 | Status: SHIPPED | OUTPATIENT
Start: 2021-01-13 | End: 2021-07-28 | Stop reason: SDUPTHER

## 2021-01-13 RX ORDER — PERPHENAZINE 2 MG
TABLET ORAL
COMMUNITY
Start: 2021-01-03 | End: 2021-07-28

## 2021-01-13 RX ORDER — HYDROCHLOROTHIAZIDE 25 MG/1
25 TABLET ORAL DAILY
Qty: 90 TABLET | Refills: 1 | Status: SHIPPED | OUTPATIENT
Start: 2021-01-13 | End: 2021-07-28 | Stop reason: SDUPTHER

## 2021-01-13 RX ORDER — AMLODIPINE BESYLATE 5 MG/1
5 TABLET ORAL DAILY
Qty: 90 TABLET | Refills: 1 | Status: SHIPPED | OUTPATIENT
Start: 2021-01-13 | End: 2021-07-28 | Stop reason: SDUPTHER

## 2021-01-13 RX ORDER — PROGESTERONE 200 MG/1
100 CAPSULE ORAL NIGHTLY
COMMUNITY
Start: 2020-12-03

## 2021-01-15 ENCOUNTER — LAB VISIT (OUTPATIENT)
Dept: PRIMARY CARE CLINIC | Facility: OTHER | Age: 71
End: 2021-01-15
Attending: INTERNAL MEDICINE
Payer: MEDICARE

## 2021-01-15 DIAGNOSIS — Z20.822 ENCOUNTER FOR LABORATORY TESTING FOR COVID-19 VIRUS: ICD-10-CM

## 2021-01-15 PROCEDURE — U0003 INFECTIOUS AGENT DETECTION BY NUCLEIC ACID (DNA OR RNA); SEVERE ACUTE RESPIRATORY SYNDROME CORONAVIRUS 2 (SARS-COV-2) (CORONAVIRUS DISEASE [COVID-19]), AMPLIFIED PROBE TECHNIQUE, MAKING USE OF HIGH THROUGHPUT TECHNOLOGIES AS DESCRIBED BY CMS-2020-01-R: HCPCS

## 2021-01-16 LAB — SARS-COV-2 RNA RESP QL NAA+PROBE: NOT DETECTED

## 2021-05-10 ENCOUNTER — PATIENT MESSAGE (OUTPATIENT)
Dept: RESEARCH | Facility: HOSPITAL | Age: 71
End: 2021-05-10

## 2021-05-19 ENCOUNTER — HOSPITAL ENCOUNTER (OUTPATIENT)
Dept: RADIOLOGY | Facility: HOSPITAL | Age: 71
Discharge: HOME | End: 2021-05-19
Attending: OBSTETRICS & GYNECOLOGY
Payer: MEDICARE

## 2021-05-19 DIAGNOSIS — Z12.31 ENCOUNTER FOR SCREENING MAMMOGRAM FOR MALIGNANT NEOPLASM OF BREAST: ICD-10-CM

## 2021-05-19 PROCEDURE — 77067 SCR MAMMO BI INCL CAD: CPT | Mod: TC,PO

## 2021-07-21 ENCOUNTER — TELEPHONE (OUTPATIENT)
Dept: FAMILY MEDICINE | Facility: CLINIC | Age: 71
End: 2021-07-21

## 2021-07-21 DIAGNOSIS — E78.5 HYPERLIPIDEMIA, UNSPECIFIED HYPERLIPIDEMIA TYPE: ICD-10-CM

## 2021-07-21 DIAGNOSIS — Z79.899 ENCOUNTER FOR LONG-TERM (CURRENT) USE OF OTHER MEDICATIONS: Primary | ICD-10-CM

## 2021-07-21 DIAGNOSIS — I10 ESSENTIAL HYPERTENSION: ICD-10-CM

## 2021-07-22 LAB
ALBUMIN SERPL-MCNC: 4.3 G/DL (ref 3.6–5.1)
ALBUMIN/GLOB SERPL: 1.7 (CALC) (ref 1–2.5)
ALP SERPL-CCNC: 70 U/L (ref 37–153)
ALT SERPL-CCNC: 18 U/L (ref 6–29)
AST SERPL-CCNC: 18 U/L (ref 10–35)
BILIRUB SERPL-MCNC: 0.4 MG/DL (ref 0.2–1.2)
BUN SERPL-MCNC: 19 MG/DL (ref 7–25)
BUN/CREAT SERPL: 18 (CALC) (ref 6–22)
CALCIUM SERPL-MCNC: 10.4 MG/DL (ref 8.6–10.4)
CHLORIDE SERPL-SCNC: 101 MMOL/L (ref 98–110)
CHOLEST SERPL-MCNC: 194 MG/DL
CHOLEST/HDLC SERPL: 2.5 (CALC)
CO2 SERPL-SCNC: 29 MMOL/L (ref 20–32)
CREAT SERPL-MCNC: 1.05 MG/DL (ref 0.5–0.99)
GLOBULIN SER CALC-MCNC: 2.6 G/DL (CALC) (ref 1.9–3.7)
GLUCOSE SERPL-MCNC: 109 MG/DL (ref 65–99)
HDLC SERPL-MCNC: 77 MG/DL
LDLC SERPL CALC-MCNC: 91 MG/DL (CALC)
NONHDLC SERPL-MCNC: 117 MG/DL (CALC)
POTASSIUM SERPL-SCNC: 4.1 MMOL/L (ref 3.5–5.3)
PROT SERPL-MCNC: 6.9 G/DL (ref 6.1–8.1)
SODIUM SERPL-SCNC: 139 MMOL/L (ref 135–146)
TRIGL SERPL-MCNC: 165 MG/DL

## 2021-07-28 ENCOUNTER — OFFICE VISIT (OUTPATIENT)
Dept: FAMILY MEDICINE | Facility: CLINIC | Age: 71
End: 2021-07-28
Payer: MEDICARE

## 2021-07-28 VITALS
WEIGHT: 243.81 LBS | DIASTOLIC BLOOD PRESSURE: 84 MMHG | SYSTOLIC BLOOD PRESSURE: 136 MMHG | HEIGHT: 66 IN | BODY MASS INDEX: 39.18 KG/M2 | HEART RATE: 82 BPM

## 2021-07-28 DIAGNOSIS — Z96.643 PRESENCE OF ARTIFICIAL HIP JOINT, BILATERAL: ICD-10-CM

## 2021-07-28 DIAGNOSIS — I10 ESSENTIAL HYPERTENSION: ICD-10-CM

## 2021-07-28 DIAGNOSIS — N39.41 URGE URINARY INCONTINENCE: ICD-10-CM

## 2021-07-28 DIAGNOSIS — M17.0 PRIMARY OSTEOARTHRITIS OF BOTH KNEES: ICD-10-CM

## 2021-07-28 DIAGNOSIS — E06.3 HYPOTHYROIDISM DUE TO HASHIMOTO'S THYROIDITIS: ICD-10-CM

## 2021-07-28 DIAGNOSIS — K21.9 GASTRO-ESOPHAGEAL REFLUX DISEASE WITHOUT ESOPHAGITIS: ICD-10-CM

## 2021-07-28 DIAGNOSIS — M75.41 ROTATOR CUFF IMPINGEMENT SYNDROME OF RIGHT SHOULDER: ICD-10-CM

## 2021-07-28 DIAGNOSIS — E78.5 HYPERLIPIDEMIA, UNSPECIFIED HYPERLIPIDEMIA TYPE: Primary | ICD-10-CM

## 2021-07-28 DIAGNOSIS — E03.8 HYPOTHYROIDISM DUE TO HASHIMOTO'S THYROIDITIS: ICD-10-CM

## 2021-07-28 DIAGNOSIS — E06.3 AUTOIMMUNE THYROIDITIS: ICD-10-CM

## 2021-07-28 PROCEDURE — 3288F PR FALLS RISK ASSESSMENT DOCUMENTED: ICD-10-PCS | Mod: S$GLB,,, | Performed by: FAMILY MEDICINE

## 2021-07-28 PROCEDURE — 3075F PR MOST RECENT SYSTOLIC BLOOD PRESS GE 130-139MM HG: ICD-10-PCS | Mod: S$GLB,,, | Performed by: FAMILY MEDICINE

## 2021-07-28 PROCEDURE — 1159F PR MEDICATION LIST DOCUMENTED IN MEDICAL RECORD: ICD-10-PCS | Mod: S$GLB,,, | Performed by: FAMILY MEDICINE

## 2021-07-28 PROCEDURE — 3288F FALL RISK ASSESSMENT DOCD: CPT | Mod: S$GLB,,, | Performed by: FAMILY MEDICINE

## 2021-07-28 PROCEDURE — 1159F MED LIST DOCD IN RCRD: CPT | Mod: S$GLB,,, | Performed by: FAMILY MEDICINE

## 2021-07-28 PROCEDURE — 3008F BODY MASS INDEX DOCD: CPT | Mod: S$GLB,,, | Performed by: FAMILY MEDICINE

## 2021-07-28 PROCEDURE — 99214 OFFICE O/P EST MOD 30 MIN: CPT | Mod: S$GLB,,, | Performed by: FAMILY MEDICINE

## 2021-07-28 PROCEDURE — 1101F PR PT FALLS ASSESS DOC 0-1 FALLS W/OUT INJ PAST YR: ICD-10-PCS | Mod: S$GLB,,, | Performed by: FAMILY MEDICINE

## 2021-07-28 PROCEDURE — 3075F SYST BP GE 130 - 139MM HG: CPT | Mod: S$GLB,,, | Performed by: FAMILY MEDICINE

## 2021-07-28 PROCEDURE — 3079F DIAST BP 80-89 MM HG: CPT | Mod: S$GLB,,, | Performed by: FAMILY MEDICINE

## 2021-07-28 PROCEDURE — 3008F PR BODY MASS INDEX (BMI) DOCUMENTED: ICD-10-PCS | Mod: S$GLB,,, | Performed by: FAMILY MEDICINE

## 2021-07-28 PROCEDURE — 3079F PR MOST RECENT DIASTOLIC BLOOD PRESSURE 80-89 MM HG: ICD-10-PCS | Mod: S$GLB,,, | Performed by: FAMILY MEDICINE

## 2021-07-28 PROCEDURE — 99214 PR OFFICE/OUTPT VISIT, EST, LEVL IV, 30-39 MIN: ICD-10-PCS | Mod: S$GLB,,, | Performed by: FAMILY MEDICINE

## 2021-07-28 PROCEDURE — 1101F PT FALLS ASSESS-DOCD LE1/YR: CPT | Mod: S$GLB,,, | Performed by: FAMILY MEDICINE

## 2021-07-28 RX ORDER — NAPROXEN SODIUM 220 MG
TABLET ORAL 4 TIMES DAILY
COMMUNITY
Start: 2021-01-29 | End: 2023-02-05

## 2021-07-28 RX ORDER — HYDROCHLOROTHIAZIDE 25 MG/1
25 TABLET ORAL DAILY
Qty: 90 TABLET | Refills: 1 | Status: SHIPPED | OUTPATIENT
Start: 2021-07-28 | End: 2022-02-02 | Stop reason: SDUPTHER

## 2021-07-28 RX ORDER — PRAVASTATIN SODIUM 40 MG/1
40 TABLET ORAL DAILY
Qty: 90 TABLET | Refills: 1 | Status: SHIPPED | OUTPATIENT
Start: 2021-07-28 | End: 2022-02-02 | Stop reason: SDUPTHER

## 2021-07-28 RX ORDER — DEXTROMETHORPHAN HYDROBROMIDE, GUAIFENESIN 5; 100 MG/5ML; MG/5ML
LIQUID ORAL
COMMUNITY
Start: 2021-01-29

## 2021-07-28 RX ORDER — AMLODIPINE BESYLATE 5 MG/1
5 TABLET ORAL DAILY
Qty: 90 TABLET | Refills: 1 | Status: SHIPPED | OUTPATIENT
Start: 2021-07-28 | End: 2022-02-02 | Stop reason: SDUPTHER

## 2021-07-29 ENCOUNTER — PATIENT MESSAGE (OUTPATIENT)
Dept: FAMILY MEDICINE | Facility: CLINIC | Age: 71
End: 2021-07-29

## 2021-08-01 ENCOUNTER — PATIENT MESSAGE (OUTPATIENT)
Dept: FAMILY MEDICINE | Facility: CLINIC | Age: 71
End: 2021-08-01

## 2021-08-08 ENCOUNTER — PATIENT MESSAGE (OUTPATIENT)
Dept: FAMILY MEDICINE | Facility: CLINIC | Age: 71
End: 2021-08-08

## 2021-08-12 ENCOUNTER — PATIENT MESSAGE (OUTPATIENT)
Dept: FAMILY MEDICINE | Facility: CLINIC | Age: 71
End: 2021-08-12

## 2021-09-13 ENCOUNTER — PATIENT MESSAGE (OUTPATIENT)
Dept: FAMILY MEDICINE | Facility: CLINIC | Age: 71
End: 2021-09-13

## 2021-09-14 ENCOUNTER — PATIENT MESSAGE (OUTPATIENT)
Dept: FAMILY MEDICINE | Facility: CLINIC | Age: 71
End: 2021-09-14

## 2021-09-30 ENCOUNTER — IMMUNIZATION (OUTPATIENT)
Dept: PRIMARY CARE CLINIC | Facility: CLINIC | Age: 71
End: 2021-09-30
Payer: MEDICARE

## 2021-09-30 DIAGNOSIS — Z23 NEED FOR VACCINATION: Primary | ICD-10-CM

## 2021-09-30 PROCEDURE — 0003A COVID-19, MRNA, LNP-S, PF, 30 MCG/0.3 ML DOSE VACCINE: CPT | Mod: S$GLB,,, | Performed by: FAMILY MEDICINE

## 2021-09-30 PROCEDURE — 91300 COVID-19, MRNA, LNP-S, PF, 30 MCG/0.3 ML DOSE VACCINE: CPT | Mod: S$GLB,,, | Performed by: FAMILY MEDICINE

## 2021-09-30 PROCEDURE — 0003A COVID-19, MRNA, LNP-S, PF, 30 MCG/0.3 ML DOSE VACCINE: ICD-10-PCS | Mod: S$GLB,,, | Performed by: FAMILY MEDICINE

## 2021-09-30 PROCEDURE — 91300 COVID-19, MRNA, LNP-S, PF, 30 MCG/0.3 ML DOSE VACCINE: ICD-10-PCS | Mod: S$GLB,,, | Performed by: FAMILY MEDICINE

## 2021-10-02 ENCOUNTER — PATIENT MESSAGE (OUTPATIENT)
Dept: FAMILY MEDICINE | Facility: CLINIC | Age: 71
End: 2021-10-02

## 2021-10-04 ENCOUNTER — PATIENT MESSAGE (OUTPATIENT)
Dept: FAMILY MEDICINE | Facility: CLINIC | Age: 71
End: 2021-10-04

## 2021-10-05 ENCOUNTER — PATIENT MESSAGE (OUTPATIENT)
Dept: FAMILY MEDICINE | Facility: CLINIC | Age: 71
End: 2021-10-05

## 2021-11-11 DIAGNOSIS — Z12.31 ENCOUNTER FOR SCREENING MAMMOGRAM FOR MALIGNANT NEOPLASM OF BREAST: Primary | ICD-10-CM

## 2022-01-17 ENCOUNTER — PATIENT MESSAGE (OUTPATIENT)
Dept: FAMILY MEDICINE | Facility: CLINIC | Age: 72
End: 2022-01-17
Payer: MEDICARE

## 2022-01-17 DIAGNOSIS — E78.5 HYPERLIPIDEMIA, UNSPECIFIED HYPERLIPIDEMIA TYPE: ICD-10-CM

## 2022-01-17 DIAGNOSIS — E06.3 HYPOTHYROIDISM DUE TO HASHIMOTO'S THYROIDITIS: ICD-10-CM

## 2022-01-17 DIAGNOSIS — I10 ESSENTIAL HYPERTENSION: ICD-10-CM

## 2022-01-17 DIAGNOSIS — E03.8 HYPOTHYROIDISM DUE TO HASHIMOTO'S THYROIDITIS: ICD-10-CM

## 2022-01-17 DIAGNOSIS — E78.2 MIXED HYPERLIPIDEMIA: ICD-10-CM

## 2022-01-17 DIAGNOSIS — Z79.899 ENCOUNTER FOR LONG-TERM (CURRENT) USE OF OTHER MEDICATIONS: Primary | ICD-10-CM

## 2022-01-27 LAB
25(OH)D3 SERPL-MCNC: 42 NG/ML (ref 30–100)
ALBUMIN SERPL-MCNC: 4.5 G/DL (ref 3.6–5.1)
ALBUMIN/CREAT UR: 11 MCG/MG CREAT
ALBUMIN/GLOB SERPL: 1.6 (CALC) (ref 1–2.5)
ALP SERPL-CCNC: 75 U/L (ref 37–153)
ALT SERPL-CCNC: 47 U/L (ref 6–29)
APPEARANCE UR: CLEAR
AST SERPL-CCNC: 36 U/L (ref 10–35)
BACTERIA #/AREA URNS HPF: ABNORMAL /HPF
BACTERIA UR CULT: ABNORMAL
BASOPHILS # BLD AUTO: 78 CELLS/UL (ref 0–200)
BASOPHILS NFR BLD AUTO: 1 %
BILIRUB SERPL-MCNC: 0.6 MG/DL (ref 0.2–1.2)
BILIRUB UR QL STRIP: NEGATIVE
BUN SERPL-MCNC: 16 MG/DL (ref 7–25)
BUN/CREAT SERPL: 15 (CALC) (ref 6–22)
CALCIUM SERPL-MCNC: 10.7 MG/DL (ref 8.6–10.4)
CAOX CRY #/AREA URNS HPF: ABNORMAL /HPF
CHLORIDE SERPL-SCNC: 100 MMOL/L (ref 98–110)
CHOLEST SERPL-MCNC: 194 MG/DL
CHOLEST/HDLC SERPL: 2.5 (CALC)
CO2 SERPL-SCNC: 31 MMOL/L (ref 20–32)
COLOR UR: ABNORMAL
CREAT SERPL-MCNC: 1.04 MG/DL (ref 0.5–0.99)
CREAT UR-MCNC: 193 MG/DL (ref 20–275)
EOSINOPHIL # BLD AUTO: 367 CELLS/UL (ref 15–500)
EOSINOPHIL NFR BLD AUTO: 4.7 %
ERYTHROCYTE [DISTWIDTH] IN BLOOD BY AUTOMATED COUNT: 13.7 % (ref 11–15)
GLOBULIN SER CALC-MCNC: 2.8 G/DL (CALC) (ref 1.9–3.7)
GLUCOSE SERPL-MCNC: 106 MG/DL (ref 65–99)
GLUCOSE UR QL STRIP: NEGATIVE
HCT VFR BLD AUTO: 40.3 % (ref 35–45)
HDLC SERPL-MCNC: 77 MG/DL
HGB BLD-MCNC: 13.9 G/DL (ref 11.7–15.5)
HGB UR QL STRIP: NEGATIVE
HYALINE CASTS #/AREA URNS LPF: ABNORMAL /LPF
KETONES UR QL STRIP: NEGATIVE
LDLC SERPL CALC-MCNC: 92 MG/DL (CALC)
LEUKOCYTE ESTERASE UR QL STRIP: NEGATIVE
LYMPHOCYTES # BLD AUTO: 3089 CELLS/UL (ref 850–3900)
LYMPHOCYTES NFR BLD AUTO: 39.6 %
MCH RBC QN AUTO: 30.6 PG (ref 27–33)
MCHC RBC AUTO-ENTMCNC: 34.5 G/DL (ref 32–36)
MCV RBC AUTO: 88.8 FL (ref 80–100)
MICROALBUMIN UR-MCNC: 2.2 MG/DL
MONOCYTES # BLD AUTO: 484 CELLS/UL (ref 200–950)
MONOCYTES NFR BLD AUTO: 6.2 %
NEUTROPHILS # BLD AUTO: 3783 CELLS/UL (ref 1500–7800)
NEUTROPHILS NFR BLD AUTO: 48.5 %
NITRITE UR QL STRIP: NEGATIVE
NONHDLC SERPL-MCNC: 117 MG/DL (CALC)
PH UR STRIP: 7.5 [PH] (ref 5–8)
PLATELET # BLD AUTO: 311 THOUSAND/UL (ref 140–400)
PMV BLD REES-ECKER: 10.1 FL (ref 7.5–12.5)
POTASSIUM SERPL-SCNC: 4.3 MMOL/L (ref 3.5–5.3)
PROT SERPL-MCNC: 7.3 G/DL (ref 6.1–8.1)
PROT UR QL STRIP: NEGATIVE
RBC # BLD AUTO: 4.54 MILLION/UL (ref 3.8–5.1)
RBC #/AREA URNS HPF: ABNORMAL /HPF
SODIUM SERPL-SCNC: 140 MMOL/L (ref 135–146)
SP GR UR STRIP: 1.02 (ref 1–1.03)
SQUAMOUS #/AREA URNS HPF: ABNORMAL /HPF
TRIGL SERPL-MCNC: 153 MG/DL
TSH SERPL-ACNC: 2.35 MIU/L (ref 0.4–4.5)
WBC # BLD AUTO: 7.8 THOUSAND/UL (ref 3.8–10.8)
WBC #/AREA URNS HPF: ABNORMAL /HPF

## 2022-02-02 ENCOUNTER — OFFICE VISIT (OUTPATIENT)
Dept: FAMILY MEDICINE | Facility: CLINIC | Age: 72
End: 2022-02-02
Payer: MEDICARE

## 2022-02-02 VITALS
HEART RATE: 98 BPM | DIASTOLIC BLOOD PRESSURE: 88 MMHG | SYSTOLIC BLOOD PRESSURE: 138 MMHG | HEIGHT: 66 IN | BODY MASS INDEX: 39.86 KG/M2 | WEIGHT: 248 LBS

## 2022-02-02 DIAGNOSIS — I10 ESSENTIAL HYPERTENSION: Primary | ICD-10-CM

## 2022-02-02 DIAGNOSIS — E78.5 HYPERLIPIDEMIA, UNSPECIFIED HYPERLIPIDEMIA TYPE: ICD-10-CM

## 2022-02-02 DIAGNOSIS — K21.9 GASTRO-ESOPHAGEAL REFLUX DISEASE WITHOUT ESOPHAGITIS: ICD-10-CM

## 2022-02-02 DIAGNOSIS — E78.2 MIXED HYPERLIPIDEMIA: ICD-10-CM

## 2022-02-02 DIAGNOSIS — N39.41 URGE URINARY INCONTINENCE: ICD-10-CM

## 2022-02-02 DIAGNOSIS — Z96.643 PRESENCE OF ARTIFICIAL HIP JOINT, BILATERAL: ICD-10-CM

## 2022-02-02 DIAGNOSIS — M17.0 PRIMARY OSTEOARTHRITIS OF BOTH KNEES: ICD-10-CM

## 2022-02-02 DIAGNOSIS — M75.41 ROTATOR CUFF IMPINGEMENT SYNDROME OF RIGHT SHOULDER: ICD-10-CM

## 2022-02-02 DIAGNOSIS — E06.3 AUTOIMMUNE THYROIDITIS: ICD-10-CM

## 2022-02-02 DIAGNOSIS — E03.8 HYPOTHYROIDISM DUE TO HASHIMOTO'S THYROIDITIS: ICD-10-CM

## 2022-02-02 DIAGNOSIS — E06.3 HYPOTHYROIDISM DUE TO HASHIMOTO'S THYROIDITIS: ICD-10-CM

## 2022-02-02 PROCEDURE — 99214 OFFICE O/P EST MOD 30 MIN: CPT | Mod: S$GLB,,, | Performed by: FAMILY MEDICINE

## 2022-02-02 PROCEDURE — 3008F PR BODY MASS INDEX (BMI) DOCUMENTED: ICD-10-PCS | Mod: S$GLB,,, | Performed by: FAMILY MEDICINE

## 2022-02-02 PROCEDURE — 3075F SYST BP GE 130 - 139MM HG: CPT | Mod: S$GLB,,, | Performed by: FAMILY MEDICINE

## 2022-02-02 PROCEDURE — 3008F BODY MASS INDEX DOCD: CPT | Mod: S$GLB,,, | Performed by: FAMILY MEDICINE

## 2022-02-02 PROCEDURE — 1101F PT FALLS ASSESS-DOCD LE1/YR: CPT | Mod: S$GLB,,, | Performed by: FAMILY MEDICINE

## 2022-02-02 PROCEDURE — 3288F PR FALLS RISK ASSESSMENT DOCUMENTED: ICD-10-PCS | Mod: S$GLB,,, | Performed by: FAMILY MEDICINE

## 2022-02-02 PROCEDURE — 3066F PR DOCUMENTATION OF TREATMENT FOR NEPHROPATHY: ICD-10-PCS | Mod: S$GLB,,, | Performed by: FAMILY MEDICINE

## 2022-02-02 PROCEDURE — 3079F PR MOST RECENT DIASTOLIC BLOOD PRESSURE 80-89 MM HG: ICD-10-PCS | Mod: S$GLB,,, | Performed by: FAMILY MEDICINE

## 2022-02-02 PROCEDURE — 3079F DIAST BP 80-89 MM HG: CPT | Mod: S$GLB,,, | Performed by: FAMILY MEDICINE

## 2022-02-02 PROCEDURE — 99214 PR OFFICE/OUTPT VISIT, EST, LEVL IV, 30-39 MIN: ICD-10-PCS | Mod: S$GLB,,, | Performed by: FAMILY MEDICINE

## 2022-02-02 PROCEDURE — 3288F FALL RISK ASSESSMENT DOCD: CPT | Mod: S$GLB,,, | Performed by: FAMILY MEDICINE

## 2022-02-02 PROCEDURE — 1101F PR PT FALLS ASSESS DOC 0-1 FALLS W/OUT INJ PAST YR: ICD-10-PCS | Mod: S$GLB,,, | Performed by: FAMILY MEDICINE

## 2022-02-02 PROCEDURE — 3061F NEG MICROALBUMINURIA REV: CPT | Mod: S$GLB,,, | Performed by: FAMILY MEDICINE

## 2022-02-02 PROCEDURE — 3075F PR MOST RECENT SYSTOLIC BLOOD PRESS GE 130-139MM HG: ICD-10-PCS | Mod: S$GLB,,, | Performed by: FAMILY MEDICINE

## 2022-02-02 PROCEDURE — 1159F PR MEDICATION LIST DOCUMENTED IN MEDICAL RECORD: ICD-10-PCS | Mod: S$GLB,,, | Performed by: FAMILY MEDICINE

## 2022-02-02 PROCEDURE — 3061F PR NEG MICROALBUMINURIA RESULT DOCUMENTED/REVIEW: ICD-10-PCS | Mod: S$GLB,,, | Performed by: FAMILY MEDICINE

## 2022-02-02 PROCEDURE — 3066F NEPHROPATHY DOC TX: CPT | Mod: S$GLB,,, | Performed by: FAMILY MEDICINE

## 2022-02-02 PROCEDURE — 1159F MED LIST DOCD IN RCRD: CPT | Mod: S$GLB,,, | Performed by: FAMILY MEDICINE

## 2022-02-02 RX ORDER — HYDROGEN PEROXIDE 3 %
20 SOLUTION, NON-ORAL MISCELLANEOUS
Qty: 90 CAPSULE | Refills: 1 | Status: SHIPPED | OUTPATIENT
Start: 2022-02-02

## 2022-02-02 RX ORDER — HYDROCHLOROTHIAZIDE 25 MG/1
25 TABLET ORAL DAILY
Qty: 90 TABLET | Refills: 1 | Status: SHIPPED | OUTPATIENT
Start: 2022-02-02 | End: 2022-08-03 | Stop reason: SDUPTHER

## 2022-02-02 RX ORDER — AMLODIPINE BESYLATE 5 MG/1
5 TABLET ORAL DAILY
Qty: 90 TABLET | Refills: 1 | Status: SHIPPED | OUTPATIENT
Start: 2022-02-02 | End: 2022-08-03 | Stop reason: SDUPTHER

## 2022-02-02 RX ORDER — PRAVASTATIN SODIUM 40 MG/1
40 TABLET ORAL DAILY
Qty: 90 TABLET | Refills: 1 | Status: SHIPPED | OUTPATIENT
Start: 2022-02-02 | End: 2022-08-03 | Stop reason: SDUPTHER

## 2022-02-02 NOTE — PROGRESS NOTES
SUBJECTIVE:    Patient ID: Ligia Krishnan is a 71 y.o. female.    Chief Complaint: Follow-up (F/U Lab-results // no bottles //abc)    This 71-year-old female was positive for COVID in August of 2021.  She lost her taste and smell for a few weeks and was short of breath.  This is now resolved.    Bilateral hip replacements-Dr. JEANNINE Carlos.  2012 and 2013.  The knees ache intermittently she takes at leave or Tylenol arthritis b.i.d..  She finds it difficult ascending and descending stairs.  Her shoulder aches at night.  She has decreased internal rotation of the right shoulder.  In    2016-colonoscopy Dr. Barone-RTC 7 years.    Endocrinology-Dr. Hernandez-manages her thyroid and parathyroid.    Mammogram-May of 2021-Dr. Tinoco      Patient Message on 01/17/2022   Component Date Value Ref Range Status    WBC 01/26/2022 7.8  3.8 - 10.8 Thousand/uL Final    RBC 01/26/2022 4.54  3.80 - 5.10 Million/uL Final    Hemoglobin 01/26/2022 13.9  11.7 - 15.5 g/dL Final    Hematocrit 01/26/2022 40.3  35.0 - 45.0 % Final    MCV 01/26/2022 88.8  80.0 - 100.0 fL Final    MCH 01/26/2022 30.6  27.0 - 33.0 pg Final    MCHC 01/26/2022 34.5  32.0 - 36.0 g/dL Final    RDW 01/26/2022 13.7  11.0 - 15.0 % Final    Platelets 01/26/2022 311  140 - 400 Thousand/uL Final    MPV 01/26/2022 10.1  7.5 - 12.5 fL Final    Neutrophils, Abs 01/26/2022 3,783  1,500 - 7,800 cells/uL Final    Lymph # 01/26/2022 3,089  850 - 3,900 cells/uL Final    Mono # 01/26/2022 484  200 - 950 cells/uL Final    Eos # 01/26/2022 367  15 - 500 cells/uL Final    Baso # 01/26/2022 78  0 - 200 cells/uL Final    Neutrophils Relative 01/26/2022 48.5  % Final    Lymph % 01/26/2022 39.6  % Final    Mono % 01/26/2022 6.2  % Final    Eosinophil % 01/26/2022 4.7  % Final    Basophil % 01/26/2022 1.0  % Final    Glucose 01/26/2022 106* 65 - 99 mg/dL Final    BUN 01/26/2022 16  7 - 25 mg/dL Final    Creatinine 01/26/2022 1.04* 0.50 - 0.99 mg/dL Final     eGFR if non African American 01/26/2022 55* > OR = 60 mL/min/1.73m2 Final    eGFR if  01/26/2022 63  > OR = 60 mL/min/1.73m2 Final    BUN/Creatinine Ratio 01/26/2022 15  6 - 22 (calc) Final    Sodium 01/26/2022 140  135 - 146 mmol/L Final    Potassium 01/26/2022 4.3  3.5 - 5.3 mmol/L Final    Chloride 01/26/2022 100  98 - 110 mmol/L Final    CO2 01/26/2022 31  20 - 32 mmol/L Final    Calcium 01/26/2022 10.7* 8.6 - 10.4 mg/dL Final    Total Protein 01/26/2022 7.3  6.1 - 8.1 g/dL Final    Albumin 01/26/2022 4.5  3.6 - 5.1 g/dL Final    Globulin, Total 01/26/2022 2.8  1.9 - 3.7 g/dL (calc) Final    Albumin/Globulin Ratio 01/26/2022 1.6  1.0 - 2.5 (calc) Final    Total Bilirubin 01/26/2022 0.6  0.2 - 1.2 mg/dL Final    Alkaline Phosphatase 01/26/2022 75  37 - 153 U/L Final    AST 01/26/2022 36* 10 - 35 U/L Final    ALT 01/26/2022 47* 6 - 29 U/L Final    Cholesterol 01/26/2022 194  <200 mg/dL Final    HDL 01/26/2022 77  > OR = 50 mg/dL Final    Triglycerides 01/26/2022 153* <150 mg/dL Final    LDL Cholesterol 01/26/2022 92  mg/dL (calc) Final    HDL/Cholesterol Ratio 01/26/2022 2.5  <5.0 (calc) Final    Non HDL Chol. (LDL+VLDL) 01/26/2022 117  <130 mg/dL (calc) Final    Creatinine, Urine 01/26/2022 193  20 - 275 mg/dL Final    Microalb, Ur 01/26/2022 2.2  See Note: mg/dL Final    Microalb/Creat Ratio 01/26/2022 11  <30 mcg/mg creat Final    TSH 01/26/2022 2.35  0.40 - 4.50 mIU/L Final    Color, UA 01/26/2022 DARK YELLOW  YELLOW Final    Appearance, UA 01/26/2022 CLEAR  CLEAR Final    Specific Gravity, UA 01/26/2022 1.020  1.001 - 1.035 Final    pH, UA 01/26/2022 7.5  5.0 - 8.0 Final    Glucose, UA 01/26/2022 NEGATIVE  NEGATIVE Final    Bilirubin, UA 01/26/2022 NEGATIVE  NEGATIVE Final    Ketones, UA 01/26/2022 NEGATIVE  NEGATIVE Final    Occult Blood UA 01/26/2022 NEGATIVE  NEGATIVE Final    Protein, UA 01/26/2022 NEGATIVE  NEGATIVE Final    Nitrite, UA 01/26/2022  NEGATIVE  NEGATIVE Final    Leukocytes, UA 2022 NEGATIVE  NEGATIVE Final    WBC Casts, UA 2022 0-5  < OR = 5 /HPF Final    RBC Casts, UA 2022 0-2  < OR = 2 /HPF Final    Squam Epithel, UA 2022 10-20* < OR = 5 /HPF Final    Bacteria, UA 2022 NONE SEEN  NONE SEEN /HPF Final    Ca Oxalate Ashleigh, UA 2022 MODERATE* NONE OR FEW /HPF Final    Hyaline Casts, UA 2022 NONE SEEN  NONE SEEN /LPF Final    Reflexive Urine Culture 2022    Final    Vitamin D, 25-OH, Total 2022 42  30 - 100 ng/mL Final       Past Medical History:   Diagnosis Date    Chronic renal insufficiency, stage III (moderate) 2017    Esophageal reflux     Hyperlipidemia     Hypertension     OA (osteoarthritis) of hip     bilateral     Social History     Socioeconomic History    Marital status:    Tobacco Use    Smoking status: Never Smoker    Smokeless tobacco: Never Used   Substance and Sexual Activity    Alcohol use: Yes     Alcohol/week: 1.0 standard drink     Types: 1 Glasses of wine per week     Comment: 1 per month    Drug use: Never    Sexual activity: Not Currently     Partners: Male     Birth control/protection: Partner-Vasectomy, Post-menopausal     Past Surgical History:   Procedure Laterality Date    APPENDECTOMY      BREAST BIOPSY      BREAST CYST ASPIRATION      CATARACT EXTRACTION Left 10/2018    COLONOSCOPY      Dr. jose-RTC 7 years    EYE SURGERY      Lasix; laser tear in retina; macular pucker repair; cataract    JOINT REPLACEMENT Right 13    (R) DAPHNEY    JOINT REPLACEMENT Left 10/18/12    (L) DAPHNEY     Family History   Problem Relation Age of Onset    Alcohol abuse Father             Cancer Father         Heavy smoker, squamous cell nasal    COPD Father         Was on oxygen    Heart disease Father         Heart attach at 54    Alcohol abuse Maternal Grandmother             Arthritis Maternal Grandmother     COPD  Maternal Grandmother         Smoker    Arthritis Mother             Heart disease Mother         Hypertension, arrythmia, congestive heart failure    Hypertension Mother     Kidney disease Mother         Low function    Depression Daughter         Dx with PCOS    Hearing loss Daughter         Left ear    Heart disease Sister         Broken heart syndrome    Learning disabilities Son         ADHD    Melanoma Neg Hx     Lupus Neg Hx     Eczema Neg Hx     Psoriasis Neg Hx        Review of patient's allergies indicates:   Allergen Reactions    Morphine Nausea And Vomiting       Current Outpatient Medications:     APPLE CIDER VINEGAR ORAL, Take by mouth., Disp: , Rfl:     b complex vitamins tablet, Take 1 tablet by mouth once daily., Disp: , Rfl:     biotin 10,000 mcg Cap, Take 1 capsule by mouth once daily. , Disp: , Rfl:     calcium carbonate-vitamin D3 (CALCIUM 600 + D,3,) 600-125 mg-unit Tab, Take by mouth once., Disp: , Rfl:     clindamycin (CLEOCIN T) 1 % lotion, Apply thin film to AA left neck BID, Disp: 60 mL, Rfl: 1    estradiol (ESTRACE) 0.5 MG tablet, Take 0.25 mg by mouth once daily. , Disp: , Rfl: 0    LACTOBACILLUS ACIDOPHILUS (PROBIOTIC ORAL), Take by mouth., Disp: , Rfl:     multivitamin capsule, Take 1 capsule by mouth daily as needed. , Disp: , Rfl:     naproxen sodium (ANAPROX) 220 MG tablet, 4 (four) times daily., Disp: , Rfl:     oxybutynin (DITROPAN-XL) 10 MG 24 hr tablet, Take 10 mg by mouth once daily., Disp: , Rfl:     progesterone (PROMETRIUM) 200 MG capsule, Take 100 mg by mouth every evening., Disp: , Rfl:     selenium 200 mcg Cap, Take by mouth., Disp: , Rfl:     SYNTHROID 25 mcg tablet, , Disp: , Rfl:     triamcinolone acetonide 0.1% (KENALOG) 0.1 % cream, Apply thin film to AA left neck bid PRN itchy flare, Disp: 30 g, Rfl: 3    acetaminophen (TYLENOL) 650 MG TbSR, , Disp: , Rfl:     amLODIPine (NORVASC) 5 MG tablet, Take 1 tablet (5 mg total) by mouth  "once daily., Disp: 90 tablet, Rfl: 1    esomeprazole (NEXIUM) 20 MG capsule, Take 1 capsule (20 mg total) by mouth before breakfast., Disp: 90 capsule, Rfl: 1    hydroCHLOROthiazide (HYDRODIURIL) 25 MG tablet, Take 1 tablet (25 mg total) by mouth once daily., Disp: 90 tablet, Rfl: 1    pravastatin (PRAVACHOL) 40 MG tablet, Take 1 tablet (40 mg total) by mouth once daily., Disp: 90 tablet, Rfl: 1    Review of Systems   Constitutional: Negative for appetite change, chills, fatigue, fever and unexpected weight change.   HENT: Negative for congestion, ear pain, sinus pain, sore throat and trouble swallowing.    Eyes: Negative for pain, discharge and visual disturbance.   Respiratory: Negative for apnea, cough, shortness of breath and wheezing.    Cardiovascular: Negative for chest pain, palpitations and leg swelling.   Gastrointestinal: Negative for abdominal pain, blood in stool, constipation, diarrhea, nausea and vomiting.   Endocrine: Negative for heat intolerance, polydipsia and polyuria.   Genitourinary: Negative for difficulty urinating, dyspareunia, dysuria, frequency, hematuria and menstrual problem.        Nocturia  2 x  night   Musculoskeletal: Positive for arthralgias (shoulders  and  knees  ache, ). Negative for back pain, gait problem, joint swelling and myalgias.   Allergic/Immunologic: Negative for environmental allergies, food allergies and immunocompromised state.   Neurological: Negative for dizziness, tremors, seizures, numbness and headaches.   Psychiatric/Behavioral: Positive for sleep disturbance (melatonin 10 mg helps ). Negative for behavioral problems, confusion, hallucinations and suicidal ideas. The patient is not nervous/anxious.           Objective:      Vitals:    02/02/22 1034   BP: 138/88   Pulse: 98   Weight: 112.5 kg (248 lb)   Height: 5' 6" (1.676 m)     Physical Exam  Vitals and nursing note reviewed.   Constitutional:       General: She is not in acute distress.     Appearance: " She is well-developed and well-nourished. She is obese. She is not toxic-appearing.   HENT:      Head: Normocephalic and atraumatic.      Right Ear: Tympanic membrane and external ear normal.      Left Ear: Tympanic membrane and external ear normal.      Nose: Nose normal.      Mouth/Throat:      Mouth: Oropharynx is clear and moist.      Pharynx: Oropharynx is clear.   Eyes:      Extraocular Movements: EOM normal.      Pupils: Pupils are equal, round, and reactive to light.   Neck:      Thyroid: No thyromegaly.      Vascular: No carotid bruit.   Cardiovascular:      Rate and Rhythm: Normal rate and regular rhythm.      Pulses: Intact distal pulses.      Heart sounds: Normal heart sounds. No murmur heard.      Pulmonary:      Effort: Pulmonary effort is normal.      Breath sounds: Normal breath sounds. No wheezing or rales.   Abdominal:      General: Bowel sounds are normal. There is no distension.      Palpations: Abdomen is soft. There is no hepatosplenomegaly.      Tenderness: There is no abdominal tenderness.   Musculoskeletal:         General: No tenderness or deformity. Normal range of motion.      Cervical back: Normal range of motion and neck supple.      Lumbar back: Normal. No pain or spasms.      Comments: Bends 90 degrees at  waist and shoulders have good range of motion knees are crepitant bilaterally no pitting edema to lower extremities   Lymphadenopathy:      Cervical: No cervical adenopathy.   Skin:     General: Skin is warm and dry.      Findings: No rash.   Neurological:      Mental Status: She is alert and oriented to person, place, and time.      Cranial Nerves: No cranial nerve deficit.      Coordination: Coordination normal.   Psychiatric:         Mood and Affect: Mood and affect normal.         Behavior: Behavior normal.         Thought Content: Thought content normal.         Judgment: Judgment normal.           Assessment:       1. Essential hypertension    2. Mixed hyperlipidemia    3.  Urge urinary incontinence    4. Autoimmune thyroiditis    5. Hypothyroidism due to Hashimoto's thyroiditis    6. Gastro-esophageal reflux disease without esophagitis    7. Presence of artificial hip joint, bilateral    8. Primary osteoarthritis of both knees    9. Hyperlipidemia, unspecified hyperlipidemia type    10. Rotator cuff impingement syndrome of right shoulder         Plan:       Essential hypertension  -     hydroCHLOROthiazide (HYDRODIURIL) 25 MG tablet; Take 1 tablet (25 mg total) by mouth once daily.  Dispense: 90 tablet; Refill: 1  -     amLODIPine (NORVASC) 5 MG tablet; Take 1 tablet (5 mg total) by mouth once daily.  Dispense: 90 tablet; Refill: 1  Blood pressure well controlled current regimen.  Mixed hyperlipidemia  -     Comprehensive Metabolic Panel; Future; Expected date: 02/02/2022  -     Lipid Panel; Future; Expected date: 02/02/2022  Cholesterol 194  LDL 92 HDL 77, continue current pravastatin  Urge urinary incontinence  Oxybutynin 10 mg  Autoimmune thyroiditis  Followed by Dr. Hernandez  Hypothyroidism due to Hashimoto's thyroiditis    Gastro-esophageal reflux disease without esophagitis  -     esomeprazole (NEXIUM) 20 MG capsule; Take 1 capsule (20 mg total) by mouth before breakfast.  Dispense: 90 capsule; Refill: 1    Presence of artificial hip joint, bilateral    Primary osteoarthritis of both knees  Continue Aleve and Tylenol arthritis.    Hyperlipidemia, unspecified hyperlipidemia type  -     pravastatin (PRAVACHOL) 40 MG tablet; Take 1 tablet (40 mg total) by mouth once daily.  Dispense: 90 tablet; Refill: 1    Rotator cuff impingement syndrome of right shoulder  Good range of motion, moderate symptoms.  No surgery required at this time    Follow up in about 6 months (around 8/2/2022), or OA, hld.        2/5/2022 Hemant Zhang

## 2022-02-22 ENCOUNTER — OFFICE VISIT (OUTPATIENT)
Dept: DERMATOLOGY | Facility: CLINIC | Age: 72
End: 2022-02-22
Payer: MEDICARE

## 2022-02-22 VITALS — HEIGHT: 66 IN | RESPIRATION RATE: 18 BRPM | WEIGHT: 248 LBS | BODY MASS INDEX: 39.86 KG/M2

## 2022-02-22 DIAGNOSIS — L82.0 INFLAMED SEBORRHEIC KERATOSIS: Primary | ICD-10-CM

## 2022-02-22 DIAGNOSIS — D22.9 MULTIPLE BENIGN NEVI: ICD-10-CM

## 2022-02-22 DIAGNOSIS — L82.1 SEBORRHEIC KERATOSES: ICD-10-CM

## 2022-02-22 DIAGNOSIS — D18.01 CHERRY ANGIOMA: ICD-10-CM

## 2022-02-22 DIAGNOSIS — D23.9 DERMATOFIBROMA: ICD-10-CM

## 2022-02-22 PROCEDURE — 3288F FALL RISK ASSESSMENT DOCD: CPT | Mod: CPTII,S$GLB,, | Performed by: DERMATOLOGY

## 2022-02-22 PROCEDURE — 1160F PR REVIEW ALL MEDS BY PRESCRIBER/CLIN PHARMACIST DOCUMENTED: ICD-10-PCS | Mod: CPTII,S$GLB,, | Performed by: DERMATOLOGY

## 2022-02-22 PROCEDURE — 3288F PR FALLS RISK ASSESSMENT DOCUMENTED: ICD-10-PCS | Mod: CPTII,S$GLB,, | Performed by: DERMATOLOGY

## 2022-02-22 PROCEDURE — 3008F PR BODY MASS INDEX (BMI) DOCUMENTED: ICD-10-PCS | Mod: CPTII,S$GLB,, | Performed by: DERMATOLOGY

## 2022-02-22 PROCEDURE — 17110 DESTRUCTION B9 LES UP TO 14: CPT | Mod: S$GLB,,, | Performed by: DERMATOLOGY

## 2022-02-22 PROCEDURE — 1160F RVW MEDS BY RX/DR IN RCRD: CPT | Mod: CPTII,S$GLB,, | Performed by: DERMATOLOGY

## 2022-02-22 PROCEDURE — 3066F NEPHROPATHY DOC TX: CPT | Mod: CPTII,S$GLB,, | Performed by: DERMATOLOGY

## 2022-02-22 PROCEDURE — 99213 OFFICE O/P EST LOW 20 MIN: CPT | Mod: 25,S$GLB,, | Performed by: DERMATOLOGY

## 2022-02-22 PROCEDURE — 3066F PR DOCUMENTATION OF TREATMENT FOR NEPHROPATHY: ICD-10-PCS | Mod: CPTII,S$GLB,, | Performed by: DERMATOLOGY

## 2022-02-22 PROCEDURE — 1159F PR MEDICATION LIST DOCUMENTED IN MEDICAL RECORD: ICD-10-PCS | Mod: CPTII,S$GLB,, | Performed by: DERMATOLOGY

## 2022-02-22 PROCEDURE — 3061F PR NEG MICROALBUMINURIA RESULT DOCUMENTED/REVIEW: ICD-10-PCS | Mod: CPTII,S$GLB,, | Performed by: DERMATOLOGY

## 2022-02-22 PROCEDURE — 99999 PR PBB SHADOW E&M-EST. PATIENT-LVL IV: CPT | Mod: PBBFAC,,, | Performed by: DERMATOLOGY

## 2022-02-22 PROCEDURE — 1126F AMNT PAIN NOTED NONE PRSNT: CPT | Mod: CPTII,S$GLB,, | Performed by: DERMATOLOGY

## 2022-02-22 PROCEDURE — 3061F NEG MICROALBUMINURIA REV: CPT | Mod: CPTII,S$GLB,, | Performed by: DERMATOLOGY

## 2022-02-22 PROCEDURE — 99999 PR PBB SHADOW E&M-EST. PATIENT-LVL IV: ICD-10-PCS | Mod: PBBFAC,,, | Performed by: DERMATOLOGY

## 2022-02-22 PROCEDURE — 99213 PR OFFICE/OUTPT VISIT, EST, LEVL III, 20-29 MIN: ICD-10-PCS | Mod: 25,S$GLB,, | Performed by: DERMATOLOGY

## 2022-02-22 PROCEDURE — 1159F MED LIST DOCD IN RCRD: CPT | Mod: CPTII,S$GLB,, | Performed by: DERMATOLOGY

## 2022-02-22 PROCEDURE — 1126F PR PAIN SEVERITY QUANTIFIED, NO PAIN PRESENT: ICD-10-PCS | Mod: CPTII,S$GLB,, | Performed by: DERMATOLOGY

## 2022-02-22 PROCEDURE — 1101F PR PT FALLS ASSESS DOC 0-1 FALLS W/OUT INJ PAST YR: ICD-10-PCS | Mod: CPTII,S$GLB,, | Performed by: DERMATOLOGY

## 2022-02-22 PROCEDURE — 17110 PR DESTRUCTION BENIGN LESIONS UP TO 14: ICD-10-PCS | Mod: S$GLB,,, | Performed by: DERMATOLOGY

## 2022-02-22 PROCEDURE — 3008F BODY MASS INDEX DOCD: CPT | Mod: CPTII,S$GLB,, | Performed by: DERMATOLOGY

## 2022-02-22 PROCEDURE — 1101F PT FALLS ASSESS-DOCD LE1/YR: CPT | Mod: CPTII,S$GLB,, | Performed by: DERMATOLOGY

## 2022-02-22 NOTE — PROGRESS NOTES
Subjective:       Patient ID:  Ligia Krishnan is a 71 y.o. female who presents for   Chief Complaint   Patient presents with    Lesion     Patient present for FBS, LOV 12/17/20 by Sailaja Reyes MD.     C/o lesion to back x years. Pt states that it is very itchy and has scratched it off. Not treating.    no Phx of NMSC.  no Fhx of melanoma.  Interim dx hashimoto, on low dose synthroid    Mother with AK    Yes fhx of NMSC  + sister    Current Outpatient Medications:     acetaminophen (TYLENOL) 650 MG TbSR, , Disp: , Rfl:     amLODIPine (NORVASC) 5 MG tablet, Take 1 tablet (5 mg total) by mouth once daily., Disp: 90 tablet, Rfl: 1    APPLE CIDER VINEGAR ORAL, Take by mouth., Disp: , Rfl:     b complex vitamins tablet, Take 1 tablet by mouth once daily., Disp: , Rfl:     biotin 10,000 mcg Cap, Take 1 capsule by mouth once daily. , Disp: , Rfl:     calcium carbonate-vitamin D3 (CALCIUM 600 + D,3,) 600-125 mg-unit Tab, Take by mouth once., Disp: , Rfl:     clindamycin (CLEOCIN T) 1 % lotion, Apply thin film to AA left neck BID, Disp: 60 mL, Rfl: 1    esomeprazole (NEXIUM) 20 MG capsule, Take 1 capsule (20 mg total) by mouth before breakfast., Disp: 90 capsule, Rfl: 1    estradiol (ESTRACE) 0.5 MG tablet, Take 0.25 mg by mouth once daily. , Disp: , Rfl: 0    hydroCHLOROthiazide (HYDRODIURIL) 25 MG tablet, Take 1 tablet (25 mg total) by mouth once daily., Disp: 90 tablet, Rfl: 1    LACTOBACILLUS ACIDOPHILUS (PROBIOTIC ORAL), Take by mouth., Disp: , Rfl:     multivitamin capsule, Take 1 capsule by mouth daily as needed. , Disp: , Rfl:     naproxen sodium (ANAPROX) 220 MG tablet, 4 (four) times daily., Disp: , Rfl:     oxybutynin (DITROPAN-XL) 10 MG 24 hr tablet, Take 10 mg by mouth once daily., Disp: , Rfl:     pravastatin (PRAVACHOL) 40 MG tablet, Take 1 tablet (40 mg total) by mouth once daily., Disp: 90 tablet, Rfl: 1    progesterone (PROMETRIUM) 200 MG capsule, Take 100 mg by mouth every  evening., Disp: , Rfl:     selenium 200 mcg Cap, Take by mouth., Disp: , Rfl:     SYNTHROID 25 mcg tablet, , Disp: , Rfl:     triamcinolone acetonide 0.1% (KENALOG) 0.1 % cream, Apply thin film to AA left neck bid PRN itchy flare, Disp: 30 g, Rfl: 3      Review of Systems   Constitutional: Negative for fever, chills, weight loss, weight gain and night sweats.   Respiratory: Negative for cough and shortness of breath.    Gastrointestinal: Negative for nausea and vomiting.   Skin: Positive for daily sunscreen use (make up and moisturizer), activity-related sunscreen use and wears hat.   Hematologic/Lymphatic: Does not bruise/bleed easily.        Objective:    Physical Exam   Constitutional: She appears well-developed and well-nourished. No distress.   Neurological: She is alert and oriented to person, place, and time. She is not disoriented.   Psychiatric: She has a normal mood and affect.   Skin:   Areas Examined (abnormalities noted in diagram):   Scalp / Hair Palpated and Inspected  Head / Face Inspection Performed  Neck Inspection Performed  Chest / Axilla Inspection Performed  Abdomen Inspection Performed  Genitals / Buttocks / Groin Inspection Performed  Back Inspection Performed  RUE Inspected  LUE Inspection Performed  RLE Inspected  LLE Inspection Performed  Nails and Digits Inspection Performed                           Diagram Legend     Erythematous scaling macule/papule c/w actinic keratosis       Vascular papule c/w angioma      Pigmented verrucoid papule/plaque c/w seborrheic keratosis      Yellow umbilicated papule c/w sebaceous hyperplasia      Irregularly shaped tan macule c/w lentigo     1-2 mm smooth white papules consistent with Milia      Movable subcutaneous cyst with punctum c/w epidermal inclusion cyst      Subcutaneous movable cyst c/w pilar cyst      Firm pink to brown papule c/w dermatofibroma      Pedunculated fleshy papule(s) c/w skin tag(s)      Evenly pigmented macule c/w junctional  nevus     Mildly variegated pigmented, slightly irregular-bordered macule c/w mildly atypical nevus      Flesh colored to evenly pigmented papule c/w intradermal nevus       Pink pearly papule/plaque c/w basal cell carcinoma      Erythematous hyperkeratotic cursted plaque c/w SCC      Surgical scar with no sign of skin cancer recurrence      Open and closed comedones      Inflammatory papules and pustules      Verrucoid papule consistent consistent with wart     Erythematous eczematous patches and plaques     Dystrophic onycholytic nail with subungual debris c/w onychomycosis     Umbilicated papule    Erythematous-base heme-crusted tan verrucoid plaque consistent with inflamed seborrheic keratosis     Erythematous Silvery Scaling Plaque c/w Psoriasis     See annotation      Assessment / Plan:        Inflamed seborrheic keratosis x3  Itchy, requests tx  Procedure note for destruction via curettage and hyfrecation    Discussed risks of procedure including but not limited to infection, persistence of lesion, recurrence of lesion, and scar. Verbal consent obtained. Area cleaned with alcohol and anesthetized with 1% lidocaine with epinephrine. Lesion(s) destroyed with hyfrecation and curettage. No complications.    Seborrheic keratoses  These are benign inherited growths without a malignant potential. Reassurance given to patient. No treatment is necessary.     Multiple benign nevi  Careful dermoscopy evaluation of nevi performed with none identified as needing biopsy today  Monitor for new mole or moles that are becoming bigger, darker, irritated, or developing irregular borders.     Cherry angioma  This is a benign vascular lesion. Reassurance given. No treatment required.     Dermatofibroma  This is a benign scar-like lesion secondary to minor trauma. No treatment required.     Patient instructed in importance in daily broad spectrum sun protection of at least spf 30. Mineral sunscreen ingredients preferred (Zinc +/-  Titanium) and can be found OTC.   Recommend Elta MD for daily use on face and neck.  Patient encouraged to wear hat for all outdoor exposure.   Also discussed sun avoidance and use of protective clothing.               Follow up in about 1 year (around 2/22/2023), or if symptoms worsen or fail to improve.

## 2022-04-26 ENCOUNTER — IMMUNIZATION (OUTPATIENT)
Dept: PRIMARY CARE CLINIC | Facility: CLINIC | Age: 72
End: 2022-04-26
Payer: MEDICARE

## 2022-04-26 DIAGNOSIS — Z23 NEED FOR VACCINATION: Primary | ICD-10-CM

## 2022-04-26 PROCEDURE — 91305 COVID-19, MRNA, LNP-S, PF, 30 MCG/0.3 ML DOSE VACCINE (PFIZER): CPT | Mod: S$GLB,,, | Performed by: FAMILY MEDICINE

## 2022-04-26 PROCEDURE — 0054A COVID-19, MRNA, LNP-S, PF, 30 MCG/0.3 ML DOSE VACCINE (PFIZER): ICD-10-PCS | Mod: S$GLB,,, | Performed by: FAMILY MEDICINE

## 2022-04-26 PROCEDURE — 0054A COVID-19, MRNA, LNP-S, PF, 30 MCG/0.3 ML DOSE VACCINE (PFIZER): CPT | Mod: S$GLB,,, | Performed by: FAMILY MEDICINE

## 2022-04-26 PROCEDURE — 91305 COVID-19, MRNA, LNP-S, PF, 30 MCG/0.3 ML DOSE VACCINE (PFIZER): ICD-10-PCS | Mod: S$GLB,,, | Performed by: FAMILY MEDICINE

## 2022-05-24 ENCOUNTER — HOSPITAL ENCOUNTER (OUTPATIENT)
Dept: RADIOLOGY | Facility: HOSPITAL | Age: 72
Discharge: HOME | End: 2022-05-24
Attending: OBSTETRICS & GYNECOLOGY
Payer: MEDICARE

## 2022-05-24 DIAGNOSIS — Z12.31 ENCOUNTER FOR SCREENING MAMMOGRAM FOR MALIGNANT NEOPLASM OF BREAST: ICD-10-CM

## 2022-05-24 PROCEDURE — 77063 BREAST TOMOSYNTHESIS BI: CPT | Mod: TC,PO

## 2022-05-24 PROCEDURE — 77067 SCR MAMMO BI INCL CAD: CPT | Mod: TC,PO

## 2022-07-21 ENCOUNTER — TELEPHONE (OUTPATIENT)
Dept: FAMILY MEDICINE | Facility: CLINIC | Age: 72
End: 2022-07-21

## 2022-07-28 LAB
ALBUMIN SERPL-MCNC: 4.3 G/DL (ref 3.6–5.1)
ALBUMIN/GLOB SERPL: 1.5 (CALC) (ref 1–2.5)
ALP SERPL-CCNC: 73 U/L (ref 37–153)
ALT SERPL-CCNC: 18 U/L (ref 6–29)
AST SERPL-CCNC: 17 U/L (ref 10–35)
BILIRUB SERPL-MCNC: 0.5 MG/DL (ref 0.2–1.2)
BUN SERPL-MCNC: 19 MG/DL (ref 7–25)
BUN/CREAT SERPL: 16 (CALC) (ref 6–22)
CALCIUM SERPL-MCNC: 10.6 MG/DL (ref 8.6–10.4)
CHLORIDE SERPL-SCNC: 101 MMOL/L (ref 98–110)
CHOLEST SERPL-MCNC: 179 MG/DL
CHOLEST/HDLC SERPL: 2.6 (CALC)
CO2 SERPL-SCNC: 33 MMOL/L (ref 20–32)
CREAT SERPL-MCNC: 1.18 MG/DL (ref 0.6–1)
EGFR: 50 ML/MIN/1.73M2
GLOBULIN SER CALC-MCNC: 2.8 G/DL (CALC) (ref 1.9–3.7)
GLUCOSE SERPL-MCNC: 109 MG/DL (ref 65–99)
HDLC SERPL-MCNC: 70 MG/DL
LDLC SERPL CALC-MCNC: 88 MG/DL (CALC)
NONHDLC SERPL-MCNC: 109 MG/DL (CALC)
POTASSIUM SERPL-SCNC: 4.2 MMOL/L (ref 3.5–5.3)
PROT SERPL-MCNC: 7.1 G/DL (ref 6.1–8.1)
SODIUM SERPL-SCNC: 142 MMOL/L (ref 135–146)
TRIGL SERPL-MCNC: 117 MG/DL

## 2022-08-03 ENCOUNTER — OFFICE VISIT (OUTPATIENT)
Dept: FAMILY MEDICINE | Facility: CLINIC | Age: 72
End: 2022-08-03
Payer: MEDICARE

## 2022-08-03 VITALS
SYSTOLIC BLOOD PRESSURE: 148 MMHG | HEIGHT: 66 IN | WEIGHT: 253 LBS | DIASTOLIC BLOOD PRESSURE: 94 MMHG | HEART RATE: 100 BPM | BODY MASS INDEX: 40.66 KG/M2

## 2022-08-03 DIAGNOSIS — E83.52 HYPERCALCEMIA: ICD-10-CM

## 2022-08-03 DIAGNOSIS — K21.9 GASTRO-ESOPHAGEAL REFLUX DISEASE WITHOUT ESOPHAGITIS: ICD-10-CM

## 2022-08-03 DIAGNOSIS — Z78.0 MENOPAUSE: ICD-10-CM

## 2022-08-03 DIAGNOSIS — N18.31 CHRONIC KIDNEY DISEASE, STAGE 3A: ICD-10-CM

## 2022-08-03 DIAGNOSIS — E78.5 HYPERLIPIDEMIA, UNSPECIFIED HYPERLIPIDEMIA TYPE: ICD-10-CM

## 2022-08-03 DIAGNOSIS — Z96.643 PRESENCE OF ARTIFICIAL HIP JOINT, BILATERAL: ICD-10-CM

## 2022-08-03 DIAGNOSIS — E66.01 MORBID OBESITY: ICD-10-CM

## 2022-08-03 DIAGNOSIS — M17.0 PRIMARY OSTEOARTHRITIS OF BOTH KNEES: ICD-10-CM

## 2022-08-03 DIAGNOSIS — I10 ESSENTIAL HYPERTENSION: Primary | ICD-10-CM

## 2022-08-03 DIAGNOSIS — N39.41 URGE URINARY INCONTINENCE: ICD-10-CM

## 2022-08-03 DIAGNOSIS — E06.3 AUTOIMMUNE THYROIDITIS: ICD-10-CM

## 2022-08-03 PROCEDURE — 3080F PR MOST RECENT DIASTOLIC BLOOD PRESSURE >= 90 MM HG: ICD-10-PCS | Mod: CPTII,S$GLB,, | Performed by: FAMILY MEDICINE

## 2022-08-03 PROCEDURE — 3077F PR MOST RECENT SYSTOLIC BLOOD PRESSURE >= 140 MM HG: ICD-10-PCS | Mod: CPTII,S$GLB,, | Performed by: FAMILY MEDICINE

## 2022-08-03 PROCEDURE — 3061F NEG MICROALBUMINURIA REV: CPT | Mod: CPTII,S$GLB,, | Performed by: FAMILY MEDICINE

## 2022-08-03 PROCEDURE — 1101F PR PT FALLS ASSESS DOC 0-1 FALLS W/OUT INJ PAST YR: ICD-10-PCS | Mod: CPTII,S$GLB,, | Performed by: FAMILY MEDICINE

## 2022-08-03 PROCEDURE — 3288F FALL RISK ASSESSMENT DOCD: CPT | Mod: CPTII,S$GLB,, | Performed by: FAMILY MEDICINE

## 2022-08-03 PROCEDURE — 3066F PR DOCUMENTATION OF TREATMENT FOR NEPHROPATHY: ICD-10-PCS | Mod: CPTII,S$GLB,, | Performed by: FAMILY MEDICINE

## 2022-08-03 PROCEDURE — 3288F PR FALLS RISK ASSESSMENT DOCUMENTED: ICD-10-PCS | Mod: CPTII,S$GLB,, | Performed by: FAMILY MEDICINE

## 2022-08-03 PROCEDURE — 99214 PR OFFICE/OUTPT VISIT, EST, LEVL IV, 30-39 MIN: ICD-10-PCS | Mod: S$GLB,,, | Performed by: FAMILY MEDICINE

## 2022-08-03 PROCEDURE — 1159F PR MEDICATION LIST DOCUMENTED IN MEDICAL RECORD: ICD-10-PCS | Mod: CPTII,S$GLB,, | Performed by: FAMILY MEDICINE

## 2022-08-03 PROCEDURE — 3080F DIAST BP >= 90 MM HG: CPT | Mod: CPTII,S$GLB,, | Performed by: FAMILY MEDICINE

## 2022-08-03 PROCEDURE — 3008F PR BODY MASS INDEX (BMI) DOCUMENTED: ICD-10-PCS | Mod: CPTII,S$GLB,, | Performed by: FAMILY MEDICINE

## 2022-08-03 PROCEDURE — 1101F PT FALLS ASSESS-DOCD LE1/YR: CPT | Mod: CPTII,S$GLB,, | Performed by: FAMILY MEDICINE

## 2022-08-03 PROCEDURE — 3061F PR NEG MICROALBUMINURIA RESULT DOCUMENTED/REVIEW: ICD-10-PCS | Mod: CPTII,S$GLB,, | Performed by: FAMILY MEDICINE

## 2022-08-03 PROCEDURE — 1159F MED LIST DOCD IN RCRD: CPT | Mod: CPTII,S$GLB,, | Performed by: FAMILY MEDICINE

## 2022-08-03 PROCEDURE — 99214 OFFICE O/P EST MOD 30 MIN: CPT | Mod: S$GLB,,, | Performed by: FAMILY MEDICINE

## 2022-08-03 PROCEDURE — 3008F BODY MASS INDEX DOCD: CPT | Mod: CPTII,S$GLB,, | Performed by: FAMILY MEDICINE

## 2022-08-03 PROCEDURE — 3077F SYST BP >= 140 MM HG: CPT | Mod: CPTII,S$GLB,, | Performed by: FAMILY MEDICINE

## 2022-08-03 PROCEDURE — 3066F NEPHROPATHY DOC TX: CPT | Mod: CPTII,S$GLB,, | Performed by: FAMILY MEDICINE

## 2022-08-03 RX ORDER — METOPROLOL SUCCINATE 25 MG/1
25 TABLET, EXTENDED RELEASE ORAL DAILY
Qty: 30 TABLET | Refills: 5 | Status: SHIPPED | OUTPATIENT
Start: 2022-08-03 | End: 2022-09-07

## 2022-08-03 RX ORDER — PRAVASTATIN SODIUM 40 MG/1
40 TABLET ORAL DAILY
Qty: 90 TABLET | Refills: 1 | Status: SHIPPED | OUTPATIENT
Start: 2022-08-03 | End: 2023-02-03 | Stop reason: SDUPTHER

## 2022-08-03 RX ORDER — ACETAMINOPHEN, DIPHENHYDRAMINE HCL, PHENYLEPHRINE HCL 325; 25; 5 MG/1; MG/1; MG/1
10 TABLET ORAL DAILY
COMMUNITY

## 2022-08-03 RX ORDER — HYDROCHLOROTHIAZIDE 25 MG/1
25 TABLET ORAL DAILY
Qty: 90 TABLET | Refills: 1 | Status: SHIPPED | OUTPATIENT
Start: 2022-08-03 | End: 2023-02-03 | Stop reason: SDUPTHER

## 2022-08-03 RX ORDER — AMLODIPINE BESYLATE 5 MG/1
5 TABLET ORAL DAILY
Qty: 90 TABLET | Refills: 1 | Status: SHIPPED | OUTPATIENT
Start: 2022-08-03 | End: 2023-02-03 | Stop reason: SDUPTHER

## 2022-08-03 NOTE — PROGRESS NOTES
SUBJECTIVE:    Patient ID: Ligia Krishnan is a 72 y.o. female.    Chief Complaint: Hypertension (Did not bring bottles//needs refills//DEXA was ordered//bs), Hyperlipidemia, and Thyroid Problem    72-year-old female comes in for her 6 month checkup.  She complains of decreased energy and some shortness of breath ever since she had COVID 1 year ago.  She does get some rapid heartbeats nocturia 2-3 times a night.    GERD-on omeprazole 20 mg daily with some Tums as needed.    Menopause-she was placed on estradiol drops sublingually by her gyn.    Left knee osteoarthritis, Aleve 2 tablets b.i.d. used for pain relief.  History of bilateral hip replacements since 2012.       No visits with results within 6 Month(s) from this visit.   Latest known visit with results is:   Office Visit on 02/02/2022   Component Date Value Ref Range Status    Glucose 07/27/2022 109 (A) 65 - 99 mg/dL Final    BUN 07/27/2022 19  7 - 25 mg/dL Final    Creatinine 07/27/2022 1.18 (A) 0.60 - 1.00 mg/dL Final    EGFR 07/27/2022 50 (A) > OR = 60 mL/min/1.73m2 Final    BUN/Creatinine Ratio 07/27/2022 16  6 - 22 (calc) Final    Sodium 07/27/2022 142  135 - 146 mmol/L Final    Potassium 07/27/2022 4.2  3.5 - 5.3 mmol/L Final    Chloride 07/27/2022 101  98 - 110 mmol/L Final    CO2 07/27/2022 33 (A) 20 - 32 mmol/L Final    Calcium 07/27/2022 10.6 (A) 8.6 - 10.4 mg/dL Final    Total Protein 07/27/2022 7.1  6.1 - 8.1 g/dL Final    Albumin 07/27/2022 4.3  3.6 - 5.1 g/dL Final    Globulin, Total 07/27/2022 2.8  1.9 - 3.7 g/dL (calc) Final    Albumin/Globulin Ratio 07/27/2022 1.5  1.0 - 2.5 (calc) Final    Total Bilirubin 07/27/2022 0.5  0.2 - 1.2 mg/dL Final    Alkaline Phosphatase 07/27/2022 73  37 - 153 U/L Final    AST 07/27/2022 17  10 - 35 U/L Final    ALT 07/27/2022 18  6 - 29 U/L Final    Cholesterol 07/27/2022 179  <200 mg/dL Final    HDL 07/27/2022 70  > OR = 50 mg/dL Final    Triglycerides 07/27/2022 117  <150 mg/dL  Final    LDL Cholesterol 07/27/2022 88  mg/dL (calc) Final    HDL/Cholesterol Ratio 07/27/2022 2.6  <5.0 (calc) Final    Non HDL Chol. (LDL+VLDL) 07/27/2022 109  <130 mg/dL (calc) Final       Past Medical History:   Diagnosis Date    Chronic renal insufficiency, stage III (moderate) 5/16/2017    Esophageal reflux     Hyperlipidemia     Hypertension     OA (osteoarthritis) of hip     bilateral     Social History     Socioeconomic History    Marital status:    Tobacco Use    Smoking status: Never Smoker    Smokeless tobacco: Never Used   Substance and Sexual Activity    Alcohol use: Yes     Alcohol/week: 1.0 standard drink     Types: 1 Glasses of wine per week     Comment: 1 per month    Drug use: Never    Sexual activity: Not Currently     Partners: Male     Birth control/protection: Partner-Vasectomy, Post-menopausal     Social Determinants of Health     Financial Resource Strain: Low Risk     Difficulty of Paying Living Expenses: Not hard at all   Food Insecurity: No Food Insecurity    Worried About Running Out of Food in the Last Year: Never true    Ran Out of Food in the Last Year: Never true   Transportation Needs: No Transportation Needs    Lack of Transportation (Medical): No    Lack of Transportation (Non-Medical): No   Physical Activity: Inactive    Days of Exercise per Week: 0 days    Minutes of Exercise per Session: 0 min   Stress: No Stress Concern Present    Feeling of Stress : Not at all   Social Connections: Unknown    Frequency of Communication with Friends and Family: More than three times a week    Frequency of Social Gatherings with Friends and Family: Once a week    Active Member of Clubs or Organizations: Yes    Attends Club or Organization Meetings: Never    Marital Status:    Housing Stability: Low Risk     Unable to Pay for Housing in the Last Year: No    Number of Places Lived in the Last Year: 1    Unstable Housing in the Last Year: No     Past  Surgical History:   Procedure Laterality Date    APPENDECTOMY      BREAST BIOPSY      BREAST CYST ASPIRATION      CATARACT EXTRACTION Left 10/2018    COLONOSCOPY  2016    Dr. jose-[facility] 7 years    EYE SURGERY      Lasix; laser tear in retina; macular pucker repair; cataract    JOINT REPLACEMENT Right 13    (R) DAPHNEY    JOINT REPLACEMENT Left 10/18/12    (L) DAPHNEY     Family History   Problem Relation Age of Onset    Arthritis Mother             Heart disease Mother         Hypertension, arrythmia, congestive heart failure    Hypertension Mother     Kidney disease Mother         Low function    Alcohol abuse Father             Cancer Father         Heavy smoker, squamous cell nasal    COPD Father         Was on oxygen    Heart disease Father         Heart attach at 54    Breast cancer Sister     Heart disease Sister         Broken heart syndrome    Depression Daughter         Dx with PCOS    Hearing loss Daughter         Left ear    Alcohol abuse Maternal Grandmother             Arthritis Maternal Grandmother     COPD Maternal Grandmother         Smoker    Learning disabilities Son         ADHD    Melanoma Neg Hx     Lupus Neg Hx     Eczema Neg Hx     Psoriasis Neg Hx        Review of patient's allergies indicates:   Allergen Reactions    Morphine Nausea And Vomiting       Current Outpatient Medications:     APPLE CIDER VINEGAR ORAL, Take by mouth., Disp: , Rfl:     b complex vitamins tablet, Take 1 tablet by mouth once daily., Disp: , Rfl:     biotin 10,000 mcg Cap, Take 1 capsule by mouth once daily. , Disp: , Rfl:     calcium carbonate-vitamin D3 (CALCIUM 600 + D,3,) 600-125 mg-unit Tab, Take by mouth once., Disp: , Rfl:     esomeprazole (NEXIUM) 20 MG capsule, Take 1 capsule (20 mg total) by mouth before breakfast., Disp: 90 capsule, Rfl: 1    estradiol (ESTRACE) 0.5 MG tablet, Take 0.25 mg by mouth once daily. , Disp: , Rfl: 0    LACTOBACILLUS  ACIDOPHILUS (PROBIOTIC ORAL), Take by mouth., Disp: , Rfl:     melatonin 10 mg Tab, Take 10 mg by mouth once daily., Disp: , Rfl:     multivitamin capsule, Take 1 capsule by mouth daily as needed. , Disp: , Rfl:     naproxen sodium (ANAPROX) 220 MG tablet, 4 (four) times daily., Disp: , Rfl:     oxybutynin (DITROPAN-XL) 10 MG 24 hr tablet, Take 10 mg by mouth once daily., Disp: , Rfl:     progesterone (PROMETRIUM) 200 MG capsule, Take 100 mg by mouth every evening., Disp: , Rfl:     selenium 200 mcg Cap, Take by mouth., Disp: , Rfl:     SYNTHROID 25 mcg tablet, , Disp: , Rfl:     acetaminophen (TYLENOL) 650 MG TbSR, , Disp: , Rfl:     amLODIPine (NORVASC) 5 MG tablet, Take 1 tablet (5 mg total) by mouth once daily., Disp: 90 tablet, Rfl: 1    clindamycin (CLEOCIN T) 1 % lotion, Apply thin film to AA left neck BID, Disp: 60 mL, Rfl: 1    hydroCHLOROthiazide (HYDRODIURIL) 25 MG tablet, Take 1 tablet (25 mg total) by mouth once daily., Disp: 90 tablet, Rfl: 1    metoprolol succinate (TOPROL-XL) 25 MG 24 hr tablet, Take 1 tablet (25 mg total) by mouth once daily., Disp: 30 tablet, Rfl: 5    pravastatin (PRAVACHOL) 40 MG tablet, Take 1 tablet (40 mg total) by mouth once daily., Disp: 90 tablet, Rfl: 1    triamcinolone acetonide 0.1% (KENALOG) 0.1 % cream, Apply thin film to AA left neck bid PRN itchy flare, Disp: 30 g, Rfl: 3    Review of Systems   Constitutional: Negative for appetite change, chills, fatigue, fever and unexpected weight change.   HENT: Negative for congestion, ear pain, sinus pain, sore throat and trouble swallowing.    Eyes: Negative for pain, discharge and visual disturbance.   Respiratory: Positive for shortness of breath. Negative for apnea, cough and wheezing.    Cardiovascular: Negative for chest pain, palpitations and leg swelling.   Gastrointestinal: Negative for abdominal pain, blood in stool, constipation, diarrhea, nausea and vomiting.        GERD occasionally flares up even  "with omeprazole 20 mg.   Endocrine: Negative for heat intolerance, polydipsia and polyuria.   Genitourinary: Negative for difficulty urinating, dyspareunia, dysuria, frequency, hematuria and menstrual problem.   Musculoskeletal: Positive for arthralgias (Knees ache with arthritis.  She has bilateral hip replacements that are working well.). Negative for back pain, gait problem, joint swelling and myalgias.   Allergic/Immunologic: Negative for environmental allergies, food allergies and immunocompromised state.   Neurological: Negative for dizziness, tremors, seizures, numbness and headaches.   Psychiatric/Behavioral: Negative for behavioral problems, confusion, hallucinations and suicidal ideas. The patient is not nervous/anxious.           Objective:      Vitals:    08/03/22 0953 08/03/22 1004   BP: (!) 150/92 (!) 148/94   Pulse: 100    Weight: 114.8 kg (253 lb)    Height: 5' 6" (1.676 m)      Physical Exam  Vitals and nursing note reviewed.   Constitutional:       General: She is not in acute distress.     Appearance: She is well-developed. She is obese. She is not toxic-appearing.   HENT:      Head: Normocephalic and atraumatic.      Right Ear: Tympanic membrane and external ear normal.      Left Ear: Tympanic membrane and external ear normal.      Nose: Nose normal.      Mouth/Throat:      Pharynx: Oropharynx is clear.   Eyes:      Pupils: Pupils are equal, round, and reactive to light.   Neck:      Thyroid: No thyromegaly.      Vascular: No carotid bruit.   Cardiovascular:      Rate and Rhythm: Regular rhythm. Tachycardia present.      Heart sounds: Normal heart sounds. No murmur heard.  Pulmonary:      Effort: Pulmonary effort is normal.      Breath sounds: Normal breath sounds. No wheezing or rales.   Abdominal:      General: Bowel sounds are normal. There is no distension.      Palpations: Abdomen is soft.      Tenderness: There is no abdominal tenderness.   Musculoskeletal:         General: No tenderness or " deformity. Normal range of motion.      Cervical back: Normal range of motion and neck supple.      Lumbar back: Normal. No spasms.      Comments: Bends 90 degrees at  waist shoulders good range of motion, knees are quite crepitant.  No pitting edema to lower extremities.   Lymphadenopathy:      Cervical: No cervical adenopathy.   Skin:     General: Skin is warm and dry.      Findings: No rash.   Neurological:      Mental Status: She is alert and oriented to person, place, and time.      Cranial Nerves: No cranial nerve deficit.      Coordination: Coordination normal.   Psychiatric:         Behavior: Behavior normal.         Thought Content: Thought content normal.         Judgment: Judgment normal.           Assessment:       1. Essential hypertension    2. Menopause    3. Hyperlipidemia, unspecified hyperlipidemia type    4. Urge urinary incontinence    5. Autoimmune thyroiditis    6. Gastro-esophageal reflux disease without esophagitis    7. Presence of artificial hip joint, bilateral    8. Primary osteoarthritis of both knees    9. Morbid obesity    10. Chronic kidney disease, stage 3a    11. Hypercalcemia         Plan:       Essential hypertension  -     amLODIPine (NORVASC) 5 MG tablet; Take 1 tablet (5 mg total) by mouth once daily.  Dispense: 90 tablet; Refill: 1  -     hydroCHLOROthiazide (HYDRODIURIL) 25 MG tablet; Take 1 tablet (25 mg total) by mouth once daily.  Dispense: 90 tablet; Refill: 1  -     metoprolol succinate (TOPROL-XL) 25 MG 24 hr tablet; Take 1 tablet (25 mg total) by mouth once daily.  Dispense: 30 tablet; Refill: 5  Patient has elevated blood pressure and tachycardia.  Will add metoprolol 25 mg q.day.  recheck in 2 weeks with blood pressure visit with nurse.  Menopause  -     DXA Bone Density Spine And Hip; Future; Expected date: 08/03/2022  DEXA scan ordered  Hyperlipidemia, unspecified hyperlipidemia type  -     pravastatin (PRAVACHOL) 40 MG tablet; Take 1 tablet (40 mg total) by mouth  once daily.  Dispense: 90 tablet; Refill: 1  Cholesterol 179 HDL 70 triglycerides 117 LDL 88, excellent lipid panel.  Urge urinary incontinence    Autoimmune thyroiditis  Continue current medications  Gastro-esophageal reflux disease without esophagitis    Presence of artificial hip joint, bilateral  Doing well with hips.  Primary osteoarthritis of both knees  Knees are quite crepitant and limit prolonged activity prolonged exercise, discontinue NSAIDs as these may be contributing to elevated blood pressure, use Tylenol exclusively  Morbid obesity  Needs weight loss efforts  Chronic kidney disease, stage 3a  GFR 50  Hypercalcemia  Calcium 10.6 mildly elevated., discontinue oral calcium vitamins but continue vitamin-D 5000 IU daily    Follow up in about 2 weeks (around 8/17/2022), or 6 month- Dr BOJORQUEZ for BP Check-Up with Nurse.        8/6/2022 Hemant Zhang

## 2022-08-06 PROBLEM — E66.01 MORBID OBESITY: Status: ACTIVE | Noted: 2022-08-06

## 2022-08-06 PROBLEM — E83.52 HYPERCALCEMIA: Status: ACTIVE | Noted: 2022-08-06

## 2022-08-06 PROBLEM — Z78.0 MENOPAUSE: Status: ACTIVE | Noted: 2022-08-06

## 2022-08-11 ENCOUNTER — TELEPHONE (OUTPATIENT)
Dept: FAMILY MEDICINE | Facility: CLINIC | Age: 72
End: 2022-08-11

## 2022-08-17 ENCOUNTER — CLINICAL SUPPORT (OUTPATIENT)
Dept: FAMILY MEDICINE | Facility: CLINIC | Age: 72
End: 2022-08-17
Payer: MEDICARE

## 2022-08-17 VITALS — SYSTOLIC BLOOD PRESSURE: 148 MMHG | DIASTOLIC BLOOD PRESSURE: 88 MMHG

## 2022-08-17 NOTE — PROGRESS NOTES
Pt is here for a nurse visit blood pressure check. Per Dr. Zhang, continue taken the Amlodipine 5 mg, HCTZ 25 mg and increase pt's metoprolol from 25 mg one daily to 25 mg BID (50 mg total). Return to clinic in 2 weeks for a blood pressure check. Pt states that she should have enough of the metoprolol 25 mg to last her until she come in for her nurse visit.

## 2022-08-30 ENCOUNTER — PATIENT MESSAGE (OUTPATIENT)
Dept: OTHER | Facility: OTHER | Age: 72
End: 2022-08-30
Payer: MEDICARE

## 2022-08-31 ENCOUNTER — CLINICAL SUPPORT (OUTPATIENT)
Dept: FAMILY MEDICINE | Facility: CLINIC | Age: 72
End: 2022-08-31
Payer: MEDICARE

## 2022-08-31 VITALS — DIASTOLIC BLOOD PRESSURE: 80 MMHG | SYSTOLIC BLOOD PRESSURE: 138 MMHG

## 2022-09-01 ENCOUNTER — HOSPITAL ENCOUNTER (OUTPATIENT)
Dept: RADIOLOGY | Facility: HOSPITAL | Age: 72
Discharge: HOME | End: 2022-09-01
Attending: FAMILY MEDICINE
Payer: MEDICARE

## 2022-09-01 DIAGNOSIS — Z78.0 MENOPAUSE: ICD-10-CM

## 2022-09-01 PROCEDURE — 77080 DXA BONE DENSITY AXIAL: CPT | Mod: TC,PO

## 2022-09-01 PROCEDURE — 77081 DXA BONE DENSITY APPENDICULR: CPT | Mod: TC,59,PO

## 2022-09-05 NOTE — PROGRESS NOTES
Call patient.  DEXA scan shows osteopenia of the bones.  Treatment includes taking calcium plus D vitamins twice a day.  Recheck DEXA scan in 2 years

## 2022-09-06 ENCOUNTER — TELEPHONE (OUTPATIENT)
Dept: FAMILY MEDICINE | Facility: CLINIC | Age: 72
End: 2022-09-06

## 2022-09-06 NOTE — TELEPHONE ENCOUNTER
Spoke to patient with results verbatim per Dr Zhang. Verbalized understanding on all. Said that she is on Vitamin D3, said that Dr Zhang told her to get off of calcium due to her lab results. Does he want her to start back? FYI to Dr Zhang.

## 2022-09-06 NOTE — TELEPHONE ENCOUNTER
----- Message from Hemant Zhang MD sent at 9/5/2022 11:52 AM CDT -----  Call patient.  DEXA scan shows osteopenia of the bones.  Treatment includes taking calcium plus D vitamins twice a day.  Recheck DEXA scan in 2 years

## 2022-09-25 ENCOUNTER — PATIENT MESSAGE (OUTPATIENT)
Dept: FAMILY MEDICINE | Facility: CLINIC | Age: 72
End: 2022-09-25

## 2022-09-25 DIAGNOSIS — I10 ESSENTIAL HYPERTENSION: ICD-10-CM

## 2022-09-26 RX ORDER — METOPROLOL SUCCINATE 25 MG/1
25 TABLET, EXTENDED RELEASE ORAL 2 TIMES DAILY
Qty: 60 TABLET | Refills: 1 | Status: SHIPPED | OUTPATIENT
Start: 2022-09-26 | End: 2022-11-18 | Stop reason: SDUPTHER

## 2022-10-23 ENCOUNTER — PATIENT MESSAGE (OUTPATIENT)
Dept: FAMILY MEDICINE | Facility: CLINIC | Age: 72
End: 2022-10-23

## 2022-11-18 ENCOUNTER — PATIENT MESSAGE (OUTPATIENT)
Dept: FAMILY MEDICINE | Facility: CLINIC | Age: 72
End: 2022-11-18

## 2022-11-18 DIAGNOSIS — I10 ESSENTIAL HYPERTENSION: ICD-10-CM

## 2022-11-18 RX ORDER — METOPROLOL SUCCINATE 25 MG/1
25 TABLET, EXTENDED RELEASE ORAL 2 TIMES DAILY
Qty: 180 TABLET | Refills: 1 | Status: SHIPPED | OUTPATIENT
Start: 2022-11-18 | End: 2023-02-03 | Stop reason: SDUPTHER

## 2023-01-18 ENCOUNTER — TELEPHONE (OUTPATIENT)
Dept: FAMILY MEDICINE | Facility: CLINIC | Age: 73
End: 2023-01-18

## 2023-01-18 DIAGNOSIS — E03.8 HYPOTHYROIDISM DUE TO HASHIMOTO'S THYROIDITIS: ICD-10-CM

## 2023-01-18 DIAGNOSIS — E06.3 HYPOTHYROIDISM DUE TO HASHIMOTO'S THYROIDITIS: ICD-10-CM

## 2023-01-18 DIAGNOSIS — E78.5 HYPERLIPIDEMIA, UNSPECIFIED HYPERLIPIDEMIA TYPE: ICD-10-CM

## 2023-01-18 DIAGNOSIS — Z79.899 ENCOUNTER FOR LONG-TERM (CURRENT) USE OF OTHER MEDICATIONS: Primary | ICD-10-CM

## 2023-01-18 DIAGNOSIS — I10 ESSENTIAL HYPERTENSION: ICD-10-CM

## 2023-01-28 LAB
ALBUMIN SERPL-MCNC: 4 G/DL (ref 3.6–5.1)
ALBUMIN/GLOB SERPL: 1.4 (CALC) (ref 1–2.5)
ALP SERPL-CCNC: 71 U/L (ref 37–153)
ALT SERPL-CCNC: 32 U/L (ref 6–29)
AST SERPL-CCNC: 25 U/L (ref 10–35)
BASOPHILS # BLD AUTO: 53 CELLS/UL (ref 0–200)
BASOPHILS NFR BLD AUTO: 0.7 %
BILIRUB SERPL-MCNC: 0.4 MG/DL (ref 0.2–1.2)
BUN SERPL-MCNC: 18 MG/DL (ref 7–25)
BUN/CREAT SERPL: ABNORMAL (CALC) (ref 6–22)
CALCIUM SERPL-MCNC: 10.3 MG/DL (ref 8.6–10.4)
CHLORIDE SERPL-SCNC: 101 MMOL/L (ref 98–110)
CHOLEST SERPL-MCNC: 185 MG/DL
CHOLEST/HDLC SERPL: 2.6 (CALC)
CO2 SERPL-SCNC: 34 MMOL/L (ref 20–32)
CREAT SERPL-MCNC: 0.99 MG/DL (ref 0.6–1)
EGFR: 61 ML/MIN/1.73M2
EOSINOPHIL # BLD AUTO: 198 CELLS/UL (ref 15–500)
EOSINOPHIL NFR BLD AUTO: 2.6 %
ERYTHROCYTE [DISTWIDTH] IN BLOOD BY AUTOMATED COUNT: 13.2 % (ref 11–15)
GLOBULIN SER CALC-MCNC: 2.9 G/DL (CALC) (ref 1.9–3.7)
GLUCOSE SERPL-MCNC: 99 MG/DL (ref 65–99)
HCT VFR BLD AUTO: 39.2 % (ref 35–45)
HDLC SERPL-MCNC: 72 MG/DL
HGB BLD-MCNC: 13.4 G/DL (ref 11.7–15.5)
LDLC SERPL CALC-MCNC: 87 MG/DL (CALC)
LYMPHOCYTES # BLD AUTO: 2835 CELLS/UL (ref 850–3900)
LYMPHOCYTES NFR BLD AUTO: 37.3 %
MCH RBC QN AUTO: 30.8 PG (ref 27–33)
MCHC RBC AUTO-ENTMCNC: 34.2 G/DL (ref 32–36)
MCV RBC AUTO: 90.1 FL (ref 80–100)
MONOCYTES # BLD AUTO: 600 CELLS/UL (ref 200–950)
MONOCYTES NFR BLD AUTO: 7.9 %
NEUTROPHILS # BLD AUTO: 3914 CELLS/UL (ref 1500–7800)
NEUTROPHILS NFR BLD AUTO: 51.5 %
NONHDLC SERPL-MCNC: 113 MG/DL (CALC)
PLATELET # BLD AUTO: 254 THOUSAND/UL (ref 140–400)
PMV BLD REES-ECKER: 10.1 FL (ref 7.5–12.5)
POTASSIUM SERPL-SCNC: 4.1 MMOL/L (ref 3.5–5.3)
PROT SERPL-MCNC: 6.9 G/DL (ref 6.1–8.1)
RBC # BLD AUTO: 4.35 MILLION/UL (ref 3.8–5.1)
SODIUM SERPL-SCNC: 142 MMOL/L (ref 135–146)
TRIGL SERPL-MCNC: 164 MG/DL
TSH SERPL-ACNC: 2.05 MIU/L (ref 0.4–4.5)
WBC # BLD AUTO: 7.6 THOUSAND/UL (ref 3.8–10.8)

## 2023-02-02 ENCOUNTER — TELEPHONE (OUTPATIENT)
Dept: FAMILY MEDICINE | Facility: CLINIC | Age: 73
End: 2023-02-02

## 2023-02-02 DIAGNOSIS — N30.00 ACUTE CYSTITIS WITHOUT HEMATURIA: Primary | ICD-10-CM

## 2023-02-02 LAB
ALBUMIN/CREAT UR: 44 MCG/MG CREAT
APPEARANCE UR: CLEAR
BACTERIA #/AREA URNS HPF: ABNORMAL /HPF
BACTERIA UR CULT: ABNORMAL
BACTERIA UR CULT: ABNORMAL
BILIRUB UR QL STRIP: NEGATIVE
COLOR UR: ABNORMAL
CREAT UR-MCNC: 81 MG/DL (ref 20–275)
GLUCOSE UR QL STRIP: NEGATIVE
HGB UR QL STRIP: NEGATIVE
HYALINE CASTS #/AREA URNS LPF: ABNORMAL /LPF
KETONES UR QL STRIP: NEGATIVE
LEUKOCYTE ESTERASE UR QL STRIP: ABNORMAL
MICROALBUMIN UR-MCNC: 3.6 MG/DL
NITRITE UR QL STRIP: POSITIVE
PH UR STRIP: 6.5 [PH] (ref 5–8)
PROT UR QL STRIP: NEGATIVE
RBC #/AREA URNS HPF: ABNORMAL /HPF
SERVICE CMNT-IMP: ABNORMAL
SP GR UR STRIP: 1.01 (ref 1–1.03)
SQUAMOUS #/AREA URNS HPF: ABNORMAL /HPF
WBC #/AREA URNS HPF: ABNORMAL /HPF

## 2023-02-02 RX ORDER — NITROFURANTOIN 25; 75 MG/1; MG/1
100 CAPSULE ORAL 2 TIMES DAILY
Qty: 20 CAPSULE | Refills: 0 | Status: SHIPPED | OUTPATIENT
Start: 2023-02-02 | End: 2023-05-26

## 2023-02-03 ENCOUNTER — OFFICE VISIT (OUTPATIENT)
Dept: FAMILY MEDICINE | Facility: CLINIC | Age: 73
End: 2023-02-03
Payer: MEDICARE

## 2023-02-03 VITALS
SYSTOLIC BLOOD PRESSURE: 130 MMHG | DIASTOLIC BLOOD PRESSURE: 88 MMHG | HEIGHT: 66 IN | HEART RATE: 98 BPM | BODY MASS INDEX: 39.21 KG/M2 | WEIGHT: 244 LBS

## 2023-02-03 DIAGNOSIS — Z78.0 MENOPAUSE: ICD-10-CM

## 2023-02-03 DIAGNOSIS — Z96.643 PRESENCE OF ARTIFICIAL HIP JOINT, BILATERAL: ICD-10-CM

## 2023-02-03 DIAGNOSIS — M75.41 ROTATOR CUFF IMPINGEMENT SYNDROME OF RIGHT SHOULDER: ICD-10-CM

## 2023-02-03 DIAGNOSIS — I10 ESSENTIAL HYPERTENSION: Primary | ICD-10-CM

## 2023-02-03 DIAGNOSIS — E03.8 HYPOTHYROIDISM DUE TO HASHIMOTO'S THYROIDITIS: ICD-10-CM

## 2023-02-03 DIAGNOSIS — M17.0 PRIMARY OSTEOARTHRITIS OF BOTH KNEES: ICD-10-CM

## 2023-02-03 DIAGNOSIS — E06.3 AUTOIMMUNE THYROIDITIS: ICD-10-CM

## 2023-02-03 DIAGNOSIS — E83.52 HYPERCALCEMIA: ICD-10-CM

## 2023-02-03 DIAGNOSIS — N39.41 URGE URINARY INCONTINENCE: ICD-10-CM

## 2023-02-03 DIAGNOSIS — E78.5 HYPERLIPIDEMIA, UNSPECIFIED HYPERLIPIDEMIA TYPE: ICD-10-CM

## 2023-02-03 DIAGNOSIS — N18.31 CHRONIC KIDNEY DISEASE, STAGE 3A: ICD-10-CM

## 2023-02-03 DIAGNOSIS — E06.3 HYPOTHYROIDISM DUE TO HASHIMOTO'S THYROIDITIS: ICD-10-CM

## 2023-02-03 PROCEDURE — 3075F SYST BP GE 130 - 139MM HG: CPT | Mod: CPTII,S$GLB,, | Performed by: FAMILY MEDICINE

## 2023-02-03 PROCEDURE — 1157F ADVNC CARE PLAN IN RCRD: CPT | Mod: CPTII,S$GLB,, | Performed by: FAMILY MEDICINE

## 2023-02-03 PROCEDURE — 1159F MED LIST DOCD IN RCRD: CPT | Mod: CPTII,S$GLB,, | Performed by: FAMILY MEDICINE

## 2023-02-03 PROCEDURE — 3079F PR MOST RECENT DIASTOLIC BLOOD PRESSURE 80-89 MM HG: ICD-10-PCS | Mod: CPTII,S$GLB,, | Performed by: FAMILY MEDICINE

## 2023-02-03 PROCEDURE — 1157F PR ADVANCE CARE PLAN OR EQUIV PRESENT IN MEDICAL RECORD: ICD-10-PCS | Mod: CPTII,S$GLB,, | Performed by: FAMILY MEDICINE

## 2023-02-03 PROCEDURE — 3060F PR POS MICROALBUMINURIA RESULT DOCUMENTED/REVIEW: ICD-10-PCS | Mod: CPTII,S$GLB,, | Performed by: FAMILY MEDICINE

## 2023-02-03 PROCEDURE — 3066F NEPHROPATHY DOC TX: CPT | Mod: CPTII,S$GLB,, | Performed by: FAMILY MEDICINE

## 2023-02-03 PROCEDURE — 3008F BODY MASS INDEX DOCD: CPT | Mod: CPTII,S$GLB,, | Performed by: FAMILY MEDICINE

## 2023-02-03 PROCEDURE — 3066F PR DOCUMENTATION OF TREATMENT FOR NEPHROPATHY: ICD-10-PCS | Mod: CPTII,S$GLB,, | Performed by: FAMILY MEDICINE

## 2023-02-03 PROCEDURE — 3079F DIAST BP 80-89 MM HG: CPT | Mod: CPTII,S$GLB,, | Performed by: FAMILY MEDICINE

## 2023-02-03 PROCEDURE — 99214 OFFICE O/P EST MOD 30 MIN: CPT | Mod: S$GLB,,, | Performed by: FAMILY MEDICINE

## 2023-02-03 PROCEDURE — 3060F POS MICROALBUMINURIA REV: CPT | Mod: CPTII,S$GLB,, | Performed by: FAMILY MEDICINE

## 2023-02-03 PROCEDURE — 3008F PR BODY MASS INDEX (BMI) DOCUMENTED: ICD-10-PCS | Mod: CPTII,S$GLB,, | Performed by: FAMILY MEDICINE

## 2023-02-03 PROCEDURE — 3075F PR MOST RECENT SYSTOLIC BLOOD PRESS GE 130-139MM HG: ICD-10-PCS | Mod: CPTII,S$GLB,, | Performed by: FAMILY MEDICINE

## 2023-02-03 PROCEDURE — 99214 PR OFFICE/OUTPT VISIT, EST, LEVL IV, 30-39 MIN: ICD-10-PCS | Mod: S$GLB,,, | Performed by: FAMILY MEDICINE

## 2023-02-03 PROCEDURE — 1159F PR MEDICATION LIST DOCUMENTED IN MEDICAL RECORD: ICD-10-PCS | Mod: CPTII,S$GLB,, | Performed by: FAMILY MEDICINE

## 2023-02-03 RX ORDER — METOPROLOL SUCCINATE 25 MG/1
25 TABLET, EXTENDED RELEASE ORAL 2 TIMES DAILY
Qty: 180 TABLET | Refills: 3 | Status: SHIPPED | OUTPATIENT
Start: 2023-02-03 | End: 2024-02-20 | Stop reason: SDUPTHER

## 2023-02-03 RX ORDER — HYDROCHLOROTHIAZIDE 25 MG/1
25 TABLET ORAL DAILY
Qty: 90 TABLET | Refills: 3 | Status: SHIPPED | OUTPATIENT
Start: 2023-02-03 | End: 2024-02-20 | Stop reason: SDUPTHER

## 2023-02-03 RX ORDER — AMLODIPINE BESYLATE 5 MG/1
5 TABLET ORAL DAILY
Qty: 90 TABLET | Refills: 3 | Status: SHIPPED | OUTPATIENT
Start: 2023-02-03 | End: 2024-02-20 | Stop reason: SDUPTHER

## 2023-02-03 RX ORDER — PRAVASTATIN SODIUM 40 MG/1
40 TABLET ORAL DAILY
Qty: 90 TABLET | Refills: 3 | Status: SHIPPED | OUTPATIENT
Start: 2023-02-03 | End: 2024-02-20 | Stop reason: SDUPTHER

## 2023-02-03 RX ORDER — AMOXICILLIN 500 MG
3 CAPSULE ORAL DAILY
COMMUNITY
End: 2023-05-26

## 2023-02-03 NOTE — PROGRESS NOTES
SUBJECTIVE:    Patient ID: Ligia Krishnan is a 72 y.o. female.    Chief Complaint: Hypertension (Brought medication list, need refills, shoulder, knee, and back pain, abc  )    72-year-old female here for six-month checkup.  She has lost 9 lb by doing intermittent fasting and OT eating 2 meals per day.    She has osteoarthritis in her shoulders, knees, fingers.  She manages it by taking arthritis strength Tylenol up to 6 tablets per day.  She is had bilateral hip replacements which do not seem to hurt.    The bottoms of her feet feel somewhat numb but not painful.    History of hypercalcemia    [facility]  She will be due later this year.      Telephone on 01/18/2023   Component Date Value Ref Range Status    WBC 01/27/2023 7.6  3.8 - 10.8 Thousand/uL Final    RBC 01/27/2023 4.35  3.80 - 5.10 Million/uL Final    Hemoglobin 01/27/2023 13.4  11.7 - 15.5 g/dL Final    Hematocrit 01/27/2023 39.2  35.0 - 45.0 % Final    MCV 01/27/2023 90.1  80.0 - 100.0 fL Final    MCH 01/27/2023 30.8  27.0 - 33.0 pg Final    MCHC 01/27/2023 34.2  32.0 - 36.0 g/dL Final    RDW 01/27/2023 13.2  11.0 - 15.0 % Final    Platelets 01/27/2023 254  140 - 400 Thousand/uL Final    MPV 01/27/2023 10.1  7.5 - 12.5 fL Final    Neutrophils, Abs 01/27/2023 3,914  1,500 - 7,800 cells/uL Final    Lymph # 01/27/2023 2,835  850 - 3,900 cells/uL Final    Mono # 01/27/2023 600  200 - 950 cells/uL Final    Eos # 01/27/2023 198  15 - 500 cells/uL Final    Baso # 01/27/2023 53  0 - 200 cells/uL Final    Neutrophils Relative 01/27/2023 51.5  % Final    Lymph % 01/27/2023 37.3  % Final    Mono % 01/27/2023 7.9  % Final    Eosinophil % 01/27/2023 2.6  % Final    Basophil % 01/27/2023 0.7  % Final    Glucose 01/27/2023 99  65 - 99 mg/dL Final    BUN 01/27/2023 18  7 - 25 mg/dL Final    Creatinine 01/27/2023 0.99  0.60 - 1.00 mg/dL Final    eGFR 01/27/2023 61  > OR = 60 mL/min/1.73m2 Final    BUN/Creatinine Ratio 01/27/2023  NOT APPLICABLE  6 - 22 (calc) Final    Sodium 01/27/2023 142  135 - 146 mmol/L Final    Potassium 01/27/2023 4.1  3.5 - 5.3 mmol/L Final    Chloride 01/27/2023 101  98 - 110 mmol/L Final    CO2 01/27/2023 34 (H)  20 - 32 mmol/L Final    Calcium 01/27/2023 10.3  8.6 - 10.4 mg/dL Final    Total Protein 01/27/2023 6.9  6.1 - 8.1 g/dL Final    Albumin 01/27/2023 4.0  3.6 - 5.1 g/dL Final    Globulin, Total 01/27/2023 2.9  1.9 - 3.7 g/dL (calc) Final    Albumin/Globulin Ratio 01/27/2023 1.4  1.0 - 2.5 (calc) Final    Total Bilirubin 01/27/2023 0.4  0.2 - 1.2 mg/dL Final    Alkaline Phosphatase 01/27/2023 71  37 - 153 U/L Final    AST 01/27/2023 25  10 - 35 U/L Final    ALT 01/27/2023 32 (H)  6 - 29 U/L Final    Cholesterol 01/27/2023 185  <200 mg/dL Final    HDL 01/27/2023 72  > OR = 50 mg/dL Final    Triglycerides 01/27/2023 164 (H)  <150 mg/dL Final    LDL Cholesterol 01/27/2023 87  mg/dL (calc) Final    HDL/Cholesterol Ratio 01/27/2023 2.6  <5.0 (calc) Final    Non HDL Chol. (LDL+VLDL) 01/27/2023 113  <130 mg/dL (calc) Final    Creatinine, Urine 01/30/2023 81  20 - 275 mg/dL Final    Microalb, Ur 01/30/2023 3.6  See Note: mg/dL Final    Microalb/Creat Ratio 01/30/2023 44 (H)  <30 mcg/mg creat Final    TSH w/reflex to FT4 01/27/2023 2.05  0.40 - 4.50 mIU/L Final    Color, UA 01/30/2023 DARK YELLOW  YELLOW Final    Appearance, UA 01/30/2023 CLEAR  CLEAR Final    Specific Gravity, UA 01/30/2023 1.014  1.001 - 1.035 Final    pH, UA 01/30/2023 6.5  5.0 - 8.0 Final    Glucose, UA 01/30/2023 NEGATIVE  NEGATIVE Final    Bilirubin, UA 01/30/2023 NEGATIVE  NEGATIVE Final    Ketones, UA 01/30/2023 NEGATIVE  NEGATIVE Final    Occult Blood UA 01/30/2023 NEGATIVE  NEGATIVE Final    Protein, UA 01/30/2023 NEGATIVE  NEGATIVE Final    Nitrite, UA 01/30/2023 POSITIVE (A)  NEGATIVE Final    Leukocytes, UA 01/30/2023 3+ (A)  NEGATIVE Final    WBC Casts, UA 01/30/2023 40-60 (A)  < OR = 5 /HPF Final    RBC Casts, UA 01/30/2023 NONE SEEN  <  OR = 2 /HPF Final    Squam Epithel, UA 01/30/2023 NONE SEEN  < OR = 5 /HPF Final    Bacteria, UA 01/30/2023 MANY (A)  NONE SEEN /HPF Final    Hyaline Casts, UA 01/30/2023 NONE SEEN  NONE SEEN /LPF Final    Service Cmt: 01/30/2023    Final    Reflexive Urine Culture 01/30/2023    Final    Urine Culture, Routine 01/30/2023  (A)   Final       Past Medical History:   Diagnosis Date    Chronic renal insufficiency, stage III (moderate) 5/16/2017    Esophageal reflux     Hyperlipidemia     Hypertension     OA (osteoarthritis) of hip     bilateral     Social History     Socioeconomic History    Marital status:    Tobacco Use    Smoking status: Never    Smokeless tobacco: Never   Substance and Sexual Activity    Alcohol use: Yes     Alcohol/week: 1.0 standard drink     Types: 1 Glasses of wine per week     Comment: 1 per month    Drug use: Never    Sexual activity: Not Currently     Partners: Male     Birth control/protection: Partner-Vasectomy, Post-menopausal     Social Determinants of Health     Financial Resource Strain: Low Risk     Difficulty of Paying Living Expenses: Not hard at all   Food Insecurity: No Food Insecurity    Worried About Running Out of Food in the Last Year: Never true    Ran Out of Food in the Last Year: Never true   Transportation Needs: No Transportation Needs    Lack of Transportation (Medical): No    Lack of Transportation (Non-Medical): No   Physical Activity: Inactive    Days of Exercise per Week: 0 days    Minutes of Exercise per Session: 0 min   Stress: No Stress Concern Present    Feeling of Stress : Not at all   Social Connections: Unknown    Frequency of Communication with Friends and Family: More than three times a week    Frequency of Social Gatherings with Friends and Family: Once a week    Active Member of Clubs or Organizations: Yes    Attends Club or Organization Meetings: Never    Marital Status:    Housing Stability: Low Risk     Unable to Pay for Housing in the Last  Year: No    Number of Places Lived in the Last Year: 1    Unstable Housing in the Last Year: No     Past Surgical History:   Procedure Laterality Date    APPENDECTOMY      BREAST BIOPSY      BREAST CYST ASPIRATION      CATARACT EXTRACTION Left 10/2018    COLONOSCOPY  2016    Dr. -[facility] 7 years    EYE SURGERY      Lasix; laser tear in retina; macular pucker repair; cataract    JOINT REPLACEMENT Right 13    (R) DAPHNEY    JOINT REPLACEMENT Left 10/18/12    (L) DAPHNEY     Family History   Problem Relation Age of Onset    Arthritis Mother             Heart disease Mother         Hypertension, arrythmia, congestive heart failure    Hypertension Mother     Kidney disease Mother         Low function    Alcohol abuse Father             Cancer Father         Heavy smoker, squamous cell nasal    COPD Father         Was on oxygen    Heart disease Father         Heart attach at 54    Breast cancer Sister     Heart disease Sister         Broken heart syndrome    Depression Daughter         Dx with PCOS    Hearing loss Daughter         Left ear    Alcohol abuse Maternal Grandmother             Arthritis Maternal Grandmother     COPD Maternal Grandmother         Smoker    Learning disabilities Son         ADHD    Melanoma Neg Hx     Lupus Neg Hx     Eczema Neg Hx     Psoriasis Neg Hx        Review of patient's allergies indicates:   Allergen Reactions    Morphine Nausea And Vomiting       Current Outpatient Medications:     acetaminophen (TYLENOL) 650 MG TbSR, , Disp: , Rfl:     APPLE CIDER VINEGAR ORAL, Take by mouth., Disp: , Rfl:     b complex vitamins tablet, Take 1 tablet by mouth once daily., Disp: , Rfl:     biotin 10,000 mcg Cap, Take 1 capsule by mouth once daily. , Disp: , Rfl:     calcium carbonate-vitamin D3 (CALCIUM 600 + D,3,) 600-125 mg-unit Tab, Take by mouth once., Disp: , Rfl:     esomeprazole (NEXIUM) 20 MG capsule, Take 1 capsule (20 mg total) by mouth before breakfast., Disp: 90  capsule, Rfl: 1    estradiol (ESTRACE) 0.5 MG tablet, Take 0.25 mg by mouth once daily. , Disp: , Rfl: 0    LACTOBACILLUS ACIDOPHILUS (PROBIOTIC ORAL), Take by mouth., Disp: , Rfl:     melatonin 10 mg Tab, Take 10 mg by mouth once daily., Disp: , Rfl:     multivitamin capsule, Take 1 capsule by mouth daily as needed. , Disp: , Rfl:     nitrofurantoin, macrocrystal-monohydrate, (MACROBID) 100 MG capsule, Take 1 capsule (100 mg total) by mouth 2 (two) times daily., Disp: 20 capsule, Rfl: 0    omega-3 fatty acids/fish oil (FISH OIL-OMEGA-3 FATTY ACIDS) 300-1,000 mg capsule, Take 3 capsules by mouth once daily., Disp: , Rfl:     oxybutynin (DITROPAN-XL) 10 MG 24 hr tablet, Take 10 mg by mouth once daily., Disp: , Rfl:     progesterone (PROMETRIUM) 200 MG capsule, Take 100 mg by mouth every evening., Disp: , Rfl:     selenium 200 mcg Cap, Take by mouth., Disp: , Rfl:     SYNTHROID 25 mcg tablet, , Disp: , Rfl:     amLODIPine (NORVASC) 5 MG tablet, Take 1 tablet (5 mg total) by mouth once daily., Disp: 90 tablet, Rfl: 3    hydroCHLOROthiazide (HYDRODIURIL) 25 MG tablet, Take 1 tablet (25 mg total) by mouth once daily., Disp: 90 tablet, Rfl: 3    metoprolol succinate (TOPROL-XL) 25 MG 24 hr tablet, Take 1 tablet (25 mg total) by mouth 2 (two) times daily., Disp: 180 tablet, Rfl: 3    naproxen sodium (ANAPROX) 220 MG tablet, 4 (four) times daily., Disp: , Rfl:     pravastatin (PRAVACHOL) 40 MG tablet, Take 1 tablet (40 mg total) by mouth once daily., Disp: 90 tablet, Rfl: 3    Review of Systems   Constitutional:  Negative for appetite change, chills, fatigue, fever and unexpected weight change.   HENT:  Negative for congestion, ear pain, sinus pain, sore throat and trouble swallowing.    Eyes:  Negative for pain, discharge and visual disturbance.   Respiratory:  Negative for apnea, cough, shortness of breath and wheezing.    Cardiovascular:  Negative for chest pain, palpitations and leg swelling.   Gastrointestinal:   "Negative for abdominal pain, blood in stool, constipation, diarrhea, nausea and vomiting.   Endocrine: Negative for heat intolerance, polydipsia and polyuria.   Genitourinary:  Negative for difficulty urinating, dyspareunia, dysuria, frequency, hematuria and menstrual problem.   Musculoskeletal:  Positive for arthralgias and back pain. Negative for gait problem, joint swelling and myalgias.   Allergic/Immunologic: Negative for environmental allergies, food allergies and immunocompromised state.   Neurological:  Negative for dizziness, tremors, seizures, numbness and headaches.   Psychiatric/Behavioral:  Negative for behavioral problems, confusion, hallucinations and suicidal ideas. The patient is not nervous/anxious.         Objective:      Vitals:    02/03/23 0945   BP: 130/88   Pulse: 98   Weight: 110.7 kg (244 lb)   Height: 5' 5.5" (1.664 m)     Physical Exam  Vitals and nursing note reviewed.   Constitutional:       General: She is not in acute distress.     Appearance: She is well-developed. She is obese. She is not toxic-appearing.   HENT:      Head: Normocephalic and atraumatic.      Right Ear: Tympanic membrane and external ear normal.      Left Ear: Tympanic membrane and external ear normal.      Nose: Nose normal.      Mouth/Throat:      Pharynx: Oropharynx is clear.   Eyes:      Pupils: Pupils are equal, round, and reactive to light.   Neck:      Thyroid: No thyromegaly.      Vascular: No carotid bruit.   Cardiovascular:      Rate and Rhythm: Normal rate and regular rhythm.      Heart sounds: Normal heart sounds. No murmur heard.  Pulmonary:      Effort: Pulmonary effort is normal.      Breath sounds: Normal breath sounds. No wheezing or rales.   Abdominal:      General: Bowel sounds are normal. There is no distension.      Palpations: Abdomen is soft.      Tenderness: There is no abdominal tenderness.   Musculoskeletal:         General: No tenderness or deformity. Normal range of motion.      Cervical " back: Normal range of motion and neck supple.      Lumbar back: Normal. No spasms.      Comments: Bends 90 degrees at  waist, shoulders and knees good range of motion without crepitance.  No pitting edema to lower extremities   Lymphadenopathy:      Cervical: No cervical adenopathy.   Skin:     General: Skin is warm and dry.      Findings: No rash.   Neurological:      General: No focal deficit present.      Mental Status: She is alert and oriented to person, place, and time.      Cranial Nerves: No cranial nerve deficit.      Coordination: Coordination normal.   Psychiatric:         Behavior: Behavior normal.         Thought Content: Thought content normal.         Judgment: Judgment normal.         Assessment:       1. Essential hypertension    2. Hyperlipidemia, unspecified hyperlipidemia type    3. Chronic kidney disease, stage 3a    4. Hypercalcemia    5. Menopause    6. Urge urinary incontinence    7. Autoimmune thyroiditis    8. Hypothyroidism due to Hashimoto's thyroiditis    9. Primary osteoarthritis of both knees    10. Presence of artificial hip joint, bilateral    11. Rotator cuff impingement syndrome of right shoulder           Plan:       Essential hypertension  -     amLODIPine (NORVASC) 5 MG tablet; Take 1 tablet (5 mg total) by mouth once daily.  Dispense: 90 tablet; Refill: 3  -     hydroCHLOROthiazide (HYDRODIURIL) 25 MG tablet; Take 1 tablet (25 mg total) by mouth once daily.  Dispense: 90 tablet; Refill: 3  -     metoprolol succinate (TOPROL-XL) 25 MG 24 hr tablet; Take 1 tablet (25 mg total) by mouth 2 (two) times daily.  Dispense: 180 tablet; Refill: 3  Blood pressure well controlled, continue current regimen  Hyperlipidemia, unspecified hyperlipidemia type  -     pravastatin (PRAVACHOL) 40 MG tablet; Take 1 tablet (40 mg total) by mouth once daily.  Dispense: 90 tablet; Refill: 3  Cholesterol 185 HDL 72  LDL 87, excellent lipid panel.  Chronic kidney disease, stage  3a    Hypercalcemia  Patient told not to take extra calcium vitamins.  She should take her vitamin-D 5000 units daily however.  Menopause    Urge urinary incontinence    Autoimmune thyroiditis    Hypothyroidism due to Hashimoto's thyroiditis    Primary osteoarthritis of both knees  Patient advised to use shoe inserts for her painful feet.  Low-impact exercise such as bicycle riding and walking should be recommended  Presence of artificial hip joint, bilateral  Doing well with bilateral hip replacements  Rotator cuff impingement syndrome of right shoulder      Follow up in about 6 months (around 8/3/2023).        2/5/2023 Hemant Zhang

## 2023-05-09 ENCOUNTER — PATIENT MESSAGE (OUTPATIENT)
Dept: ADMINISTRATIVE | Facility: OTHER | Age: 73
End: 2023-05-09
Payer: MEDICARE

## 2023-05-19 ENCOUNTER — PATIENT MESSAGE (OUTPATIENT)
Dept: ADMINISTRATIVE | Facility: OTHER | Age: 73
End: 2023-05-19
Payer: MEDICARE

## 2023-05-25 ENCOUNTER — PATIENT MESSAGE (OUTPATIENT)
Dept: DERMATOLOGY | Facility: CLINIC | Age: 73
End: 2023-05-25
Payer: MEDICARE

## 2023-05-26 ENCOUNTER — OFFICE VISIT (OUTPATIENT)
Dept: DERMATOLOGY | Facility: CLINIC | Age: 73
End: 2023-05-26
Payer: MEDICARE

## 2023-05-26 DIAGNOSIS — L82.1 SEBORRHEIC KERATOSES: ICD-10-CM

## 2023-05-26 DIAGNOSIS — Z12.83 SKIN CANCER SCREENING: ICD-10-CM

## 2023-05-26 DIAGNOSIS — D23.9 DERMATOFIBROMA: ICD-10-CM

## 2023-05-26 DIAGNOSIS — L57.0 ACTINIC KERATOSES: Primary | ICD-10-CM

## 2023-05-26 DIAGNOSIS — D22.9 MULTIPLE BENIGN NEVI: ICD-10-CM

## 2023-05-26 DIAGNOSIS — D18.01 CHERRY ANGIOMA: ICD-10-CM

## 2023-05-26 PROCEDURE — 1159F PR MEDICATION LIST DOCUMENTED IN MEDICAL RECORD: ICD-10-PCS | Mod: CPTII,S$GLB,, | Performed by: DERMATOLOGY

## 2023-05-26 PROCEDURE — 17003 DESTRUCTION, PREMALIGNANT LESIONS; SECOND THROUGH 14 LESIONS: ICD-10-PCS | Mod: S$GLB,,, | Performed by: DERMATOLOGY

## 2023-05-26 PROCEDURE — 1126F AMNT PAIN NOTED NONE PRSNT: CPT | Mod: CPTII,S$GLB,, | Performed by: DERMATOLOGY

## 2023-05-26 PROCEDURE — 1101F PR PT FALLS ASSESS DOC 0-1 FALLS W/OUT INJ PAST YR: ICD-10-PCS | Mod: CPTII,S$GLB,, | Performed by: DERMATOLOGY

## 2023-05-26 PROCEDURE — 1159F MED LIST DOCD IN RCRD: CPT | Mod: CPTII,S$GLB,, | Performed by: DERMATOLOGY

## 2023-05-26 PROCEDURE — 3060F PR POS MICROALBUMINURIA RESULT DOCUMENTED/REVIEW: ICD-10-PCS | Mod: CPTII,S$GLB,, | Performed by: DERMATOLOGY

## 2023-05-26 PROCEDURE — 3066F NEPHROPATHY DOC TX: CPT | Mod: CPTII,S$GLB,, | Performed by: DERMATOLOGY

## 2023-05-26 PROCEDURE — 1160F PR REVIEW ALL MEDS BY PRESCRIBER/CLIN PHARMACIST DOCUMENTED: ICD-10-PCS | Mod: CPTII,S$GLB,, | Performed by: DERMATOLOGY

## 2023-05-26 PROCEDURE — 17000 PR DESTRUCTION(LASER SURGERY,CRYOSURGERY,CHEMOSURGERY),PREMALIGNANT LESIONS,FIRST LESION: ICD-10-PCS | Mod: S$GLB,,, | Performed by: DERMATOLOGY

## 2023-05-26 PROCEDURE — 3060F POS MICROALBUMINURIA REV: CPT | Mod: CPTII,S$GLB,, | Performed by: DERMATOLOGY

## 2023-05-26 PROCEDURE — 17000 DESTRUCT PREMALG LESION: CPT | Mod: S$GLB,,, | Performed by: DERMATOLOGY

## 2023-05-26 PROCEDURE — 3288F PR FALLS RISK ASSESSMENT DOCUMENTED: ICD-10-PCS | Mod: CPTII,S$GLB,, | Performed by: DERMATOLOGY

## 2023-05-26 PROCEDURE — 1126F PR PAIN SEVERITY QUANTIFIED, NO PAIN PRESENT: ICD-10-PCS | Mod: CPTII,S$GLB,, | Performed by: DERMATOLOGY

## 2023-05-26 PROCEDURE — 1157F ADVNC CARE PLAN IN RCRD: CPT | Mod: CPTII,S$GLB,, | Performed by: DERMATOLOGY

## 2023-05-26 PROCEDURE — 1101F PT FALLS ASSESS-DOCD LE1/YR: CPT | Mod: CPTII,S$GLB,, | Performed by: DERMATOLOGY

## 2023-05-26 PROCEDURE — 99213 PR OFFICE/OUTPT VISIT, EST, LEVL III, 20-29 MIN: ICD-10-PCS | Mod: 25,S$GLB,, | Performed by: DERMATOLOGY

## 2023-05-26 PROCEDURE — 3288F FALL RISK ASSESSMENT DOCD: CPT | Mod: CPTII,S$GLB,, | Performed by: DERMATOLOGY

## 2023-05-26 PROCEDURE — 3066F PR DOCUMENTATION OF TREATMENT FOR NEPHROPATHY: ICD-10-PCS | Mod: CPTII,S$GLB,, | Performed by: DERMATOLOGY

## 2023-05-26 PROCEDURE — 1157F PR ADVANCE CARE PLAN OR EQUIV PRESENT IN MEDICAL RECORD: ICD-10-PCS | Mod: CPTII,S$GLB,, | Performed by: DERMATOLOGY

## 2023-05-26 PROCEDURE — 17003 DESTRUCT PREMALG LES 2-14: CPT | Mod: S$GLB,,, | Performed by: DERMATOLOGY

## 2023-05-26 PROCEDURE — 99213 OFFICE O/P EST LOW 20 MIN: CPT | Mod: 25,S$GLB,, | Performed by: DERMATOLOGY

## 2023-05-26 PROCEDURE — 1160F RVW MEDS BY RX/DR IN RCRD: CPT | Mod: CPTII,S$GLB,, | Performed by: DERMATOLOGY

## 2023-05-26 NOTE — PATIENT INSTRUCTIONS

## 2023-05-26 NOTE — PROGRESS NOTES
Subjective:      Patient ID:  Ligia Krishnan is a 73 y.o. female who presents for   Chief Complaint   Patient presents with    Follow-up     Annual      LOV 2/22/22 - ISK, SK melt, Multiple benign nevi    Patient here today for skin check  Patient states she has no new spots of concern     no Phx of NMSC.  no Fhx of melanoma.  Interim dx hashimoto, on low dose synthroid     Mother with AK     Yes fhx of NMSC  + sister    Current Outpatient Medications:   ·  acetaminophen (TYLENOL) 650 MG TbSR, , Disp: , Rfl:   ·  APPLE CIDER VINEGAR ORAL, Take by mouth., Disp: , Rfl:   ·  b complex vitamins tablet, Take 1 tablet by mouth once daily., Disp: , Rfl:   ·  biotin 10,000 mcg Cap, Take 1 capsule by mouth once daily. , Disp: , Rfl:   ·  esomeprazole (NEXIUM) 20 MG capsule, Take 1 capsule (20 mg total) by mouth before breakfast., Disp: 90 capsule, Rfl: 1  ·  estradiol (ESTRACE) 0.5 MG tablet, Take 0.25 mg by mouth once daily. , Disp: , Rfl: 0  ·  hydroCHLOROthiazide (HYDRODIURIL) 25 MG tablet, Take 1 tablet (25 mg total) by mouth once daily., Disp: 90 tablet, Rfl: 3  ·  LACTOBACILLUS ACIDOPHILUS (PROBIOTIC ORAL), Take by mouth., Disp: , Rfl:   ·  melatonin 10 mg Tab, Take 10 mg by mouth once daily., Disp: , Rfl:   ·  metoprolol succinate (TOPROL-XL) 25 MG 24 hr tablet, Take 1 tablet (25 mg total) by mouth 2 (two) times daily., Disp: 180 tablet, Rfl: 3  ·  multivitamin capsule, Take 1 capsule by mouth daily as needed. , Disp: , Rfl:   ·  oxybutynin (DITROPAN-XL) 10 MG 24 hr tablet, Take 10 mg by mouth once daily., Disp: , Rfl:   ·  pravastatin (PRAVACHOL) 40 MG tablet, Take 1 tablet (40 mg total) by mouth once daily., Disp: 90 tablet, Rfl: 3  ·  progesterone (PROMETRIUM) 200 MG capsule, Take 100 mg by mouth every evening., Disp: , Rfl:   ·  selenium 200 mcg Cap, Take by mouth., Disp: , Rfl:   ·  SYNTHROID 25 mcg tablet, , Disp: , Rfl:   ·  amLODIPine (NORVASC) 5 MG tablet, Take 1 tablet (5 mg total) by mouth once  daily., Disp: 90 tablet, Rfl: 3        Review of Systems   Constitutional:  Negative for fever, chills, weight loss, weight gain and night sweats.   Respiratory:  Negative for cough and shortness of breath.    Gastrointestinal:  Negative for nausea and vomiting.   Skin:  Positive for daily sunscreen use (make up and moisturizer), activity-related sunscreen use and wears hat.   Hematologic/Lymphatic: Does not bruise/bleed easily.     Objective:   Physical Exam   Constitutional: She appears well-developed and well-nourished. No distress.   Neurological: She is alert and oriented to person, place, and time. She is not disoriented.   Psychiatric: She has a normal mood and affect.   Skin:   Areas Examined (abnormalities noted in diagram):   Scalp / Hair Palpated and Inspected  Head / Face Inspection Performed  Neck Inspection Performed  Chest / Axilla Inspection Performed  Abdomen Inspection Performed  Genitals / Buttocks / Groin Inspection Performed  Back Inspection Performed  RUE Inspected  LUE Inspection Performed  RLE Inspected  LLE Inspection Performed  Nails and Digits Inspection Performed                       Diagram Legend     Erythematous scaling macule/papule c/w actinic keratosis       Vascular papule c/w angioma      Pigmented verrucoid papule/plaque c/w seborrheic keratosis      Yellow umbilicated papule c/w sebaceous hyperplasia      Irregularly shaped tan macule c/w lentigo     1-2 mm smooth white papules consistent with Milia      Movable subcutaneous cyst with punctum c/w epidermal inclusion cyst      Subcutaneous movable cyst c/w pilar cyst      Firm pink to brown papule c/w dermatofibroma      Pedunculated fleshy papule(s) c/w skin tag(s)      Evenly pigmented macule c/w junctional nevus     Mildly variegated pigmented, slightly irregular-bordered macule c/w mildly atypical nevus      Flesh colored to evenly pigmented papule c/w intradermal nevus       Pink pearly papule/plaque c/w basal cell  carcinoma      Erythematous hyperkeratotic cursted plaque c/w SCC      Surgical scar with no sign of skin cancer recurrence      Open and closed comedones      Inflammatory papules and pustules      Verrucoid papule consistent consistent with wart     Erythematous eczematous patches and plaques     Dystrophic onycholytic nail with subungual debris c/w onychomycosis     Umbilicated papule    Erythematous-base heme-crusted tan verrucoid plaque consistent with inflamed seborrheic keratosis     Erythematous Silvery Scaling Plaque c/w Psoriasis     See annotation      Assessment / Plan:        Actinic keratoses  Cryosurgery Procedure Note    Verbal consent from the patient is obtained and the patient is aware of the precancerous quality and need for treatment of these lesions. Liquid nitrogen cryosurgery is applied to the 3 actinic keratoses, as detailed in the physical exam, to produce a freeze injury. The patient is aware that blisters may form and is instructed on wound care with gentle cleansing and use of vaseline ointment to keep moist until healed. The patient is supplied a handout on cryosurgery and is instructed to call if lesions do not completely resolve.    Seborrheic keratoses  These are benign inherited growths without a malignant potential. Reassurance given to patient. No treatment is necessary.     Cherry angioma  This is a benign vascular lesion. Reassurance given. No treatment required.     Multiple benign nevi  Careful dermoscopy evaluation of nevi performed with none identified as needing biopsy today  Monitor for new mole or moles that are becoming bigger, darker, irritated, or developing irregular borders.     Dermatofibroma  This is a benign scar-like lesion secondary to minor trauma. No treatment required.     Skin cancer screening  Total body skin examination performed today including at least 12 points as noted in physical examination. No lesions suspicious for malignancy noted.    Patient  instructed in importance in daily broad spectrum sun protection of at least spf 30. Mineral sunscreen ingredients preferred (Zinc +/- Titanium) and can be found OTC.   Recommend Elta MD for daily use on face and neck.  Patient encouraged to wear hat for all outdoor exposure.   Also discussed sun avoidance and use of protective clothing.             Follow up if symptoms worsen or fail to improve.

## 2023-06-01 DIAGNOSIS — Z12.31 ENCOUNTER FOR SCREENING MAMMOGRAM FOR MALIGNANT NEOPLASM OF BREAST: Primary | ICD-10-CM

## 2023-06-09 ENCOUNTER — HOSPITAL ENCOUNTER (OUTPATIENT)
Dept: RADIOLOGY | Facility: HOSPITAL | Age: 73
Discharge: HOME | End: 2023-06-09
Attending: OBSTETRICS & GYNECOLOGY
Payer: MEDICARE

## 2023-06-09 VITALS — BODY MASS INDEX: 39.22 KG/M2 | WEIGHT: 244.06 LBS | HEIGHT: 66 IN

## 2023-06-09 DIAGNOSIS — Z12.31 ENCOUNTER FOR SCREENING MAMMOGRAM FOR MALIGNANT NEOPLASM OF BREAST: ICD-10-CM

## 2023-06-09 PROCEDURE — 77067 SCR MAMMO BI INCL CAD: CPT | Mod: TC,PO

## 2023-06-13 DIAGNOSIS — R92.8 MAMMOGRAM ABNORMAL: Primary | ICD-10-CM

## 2023-06-21 ENCOUNTER — HOSPITAL ENCOUNTER (OUTPATIENT)
Dept: RADIOLOGY | Facility: HOSPITAL | Age: 73
Discharge: HOME | End: 2023-06-21
Attending: OBSTETRICS & GYNECOLOGY
Payer: MEDICARE

## 2023-06-21 DIAGNOSIS — R92.8 MAMMOGRAM ABNORMAL: ICD-10-CM

## 2023-06-21 PROCEDURE — 76642 ULTRASOUND BREAST LIMITED: CPT | Mod: TC,PO,LT

## 2023-06-21 PROCEDURE — 77061 BREAST TOMOSYNTHESIS UNI: CPT | Mod: TC,PO,LT

## 2023-07-27 ENCOUNTER — TELEPHONE (OUTPATIENT)
Dept: FAMILY MEDICINE | Facility: CLINIC | Age: 73
End: 2023-07-27

## 2023-07-28 LAB — CRC RECOMMENDATION EXT: NORMAL

## 2023-07-31 ENCOUNTER — PATIENT OUTREACH (OUTPATIENT)
Dept: ADMINISTRATIVE | Facility: HOSPITAL | Age: 73
End: 2023-07-31
Payer: MEDICARE

## 2023-07-31 NOTE — PROGRESS NOTES
Population Health Chart Review & Patient Outreach Details:     Reason for Outreach Encounter:     [x]  Non-Compliant Report   []  Payor Report ([employer], PHN, BCBS, MSSP, MCIP, UHC, etc.)   []  Pre-Visit Chart Review     Updates Requested / Reviewed:     []  Care Everywhere    [x]     []  External Sources ([facility], [facility], DIS, etc.)   []  Care Team Updated    Patient Outreach Method:    []  Telephone Outreach Completed   [] Successful   [] Left Voicemail   [] Unable to Contact (wrong number, no voicemail)  []  [employer] Portal Outreach Sent  []  Letter Outreach Mailed  []  Fax Sent for External Records  [x]  External Records Upload    Health Maintenance Topics Addressed and Outreach Outcomes / Actions Taken:        []      Breast Cancer Screening []  Mammo Scheduled      []  External Records Requested     []  Added Reminder to Complete to Upcoming Primary Care Appt Notes     []  Patient Declined     []  Patient Will Call Back to Schedule     []  Patient Will Schedule with External Provider / Order Routed if Applicable             []       Cervical Cancer Screening []  Pap Scheduled      []  External Records Requested     []  Added Reminder to Complete to Upcoming Primary Care Appt Notes     []  Patient Declined     []  Patient Will Call Back to Schedule     []  Patient Will Schedule with External Provider               [x]          Colorectal Cancer Screening []  Colonoscopy Case Request or Referral Placed     []  External Records Requested     []  Added Reminder to Complete to Upcoming Primary Care Appt Notes     []  Patient Declined     []  Patient Will Call Back to Schedule     []  Patient Will Schedule with External Provider     []  Fit Kit Mailed (add the SmartPhrase under additional notes)     []  Reminded Patient to Complete Home Test             []      Diabetic Eye Exam []  Eye Camera Scheduled or Optometry Referral Placed     []  External Records Requested     []  Added Reminder to Complete to  Upcoming Primary Care Appt Notes     []  Patient Declined     []  Patient Will Call Back to Schedule     []  Patient Will Schedule with External Provider             []      Blood Pressure Control []  Primary Care Follow Up Visit Scheduled     []  Remote Blood Pressure Reading Captured     []  Added Reminder to Complete to Upcoming Primary Care Appt Notes     []  Patient Declined     []  Patient Will Call Back / Patient Will Send Portal Message with Reading     []  Patient Will Call Back to Schedule Provider Visit             []       HbA1c & Other Labs []  Lab Appt Scheduled for Due Labs     []  Primary Care Follow Up Visit Scheduled      []  Reminded Patient to Complete Home Test     []  Added Reminder to Complete to Upcoming Primary Care Appt Notes     []  Patient Declined     []  Patient Will Call Back to Schedule     []  Patient Will Schedule with External Provider / Order Routed if Applicable           []    Schedule Primary Care Appt []  Primary Care Appt Scheduled     []  Patient Declined     []  Patient Will Call Back to Schedule     []  Pt Established with External Provider & Updated Care Team             []      Medication Adherence []  Primary Care Appointment Scheduled     []  Added Reminder to Upcoming Primary Care Appt Notes     []  Patient Reminded to  Prescription     []  Patient Declined, Provider Notified if Needed     []  Sent Provider Message to Review and/or Add Exclusion to Problem List             []      Osteoporosis Screening []  DXA Appointment Scheduled     []  External Records Requested     []  Added Reminder to Complete to Upcoming Primary Care Appt Notes     []  Patient Declined     []  Patient Will Call Back to Schedule     []  Patient Will Schedule with External Provider / Order Routed if Applicable     Additional Care Coordinator Notes:         Further Action Needed If Patient Returns Outreach:

## 2023-08-03 LAB
ALBUMIN SERPL-MCNC: 4.3 G/DL (ref 3.6–5.1)
ALBUMIN/GLOB SERPL: 1.6 (CALC) (ref 1–2.5)
ALP SERPL-CCNC: 71 U/L (ref 37–153)
ALT SERPL-CCNC: 28 U/L (ref 6–29)
AST SERPL-CCNC: 26 U/L (ref 10–35)
BILIRUB SERPL-MCNC: 0.4 MG/DL (ref 0.2–1.2)
BUN SERPL-MCNC: 15 MG/DL (ref 7–25)
BUN/CREAT SERPL: ABNORMAL (CALC) (ref 6–22)
CALCIUM SERPL-MCNC: 10.3 MG/DL (ref 8.6–10.4)
CHLORIDE SERPL-SCNC: 100 MMOL/L (ref 98–110)
CHOLEST SERPL-MCNC: 183 MG/DL
CHOLEST/HDLC SERPL: 2.6 (CALC)
CO2 SERPL-SCNC: 31 MMOL/L (ref 20–32)
CREAT SERPL-MCNC: 0.93 MG/DL (ref 0.6–1)
EGFR: 66 ML/MIN/1.73M2
GLOBULIN SER CALC-MCNC: 2.7 G/DL (CALC) (ref 1.9–3.7)
GLUCOSE SERPL-MCNC: 107 MG/DL (ref 65–99)
HDLC SERPL-MCNC: 71 MG/DL
LDLC SERPL CALC-MCNC: 84 MG/DL (CALC)
NONHDLC SERPL-MCNC: 112 MG/DL (CALC)
POTASSIUM SERPL-SCNC: 3.9 MMOL/L (ref 3.5–5.3)
PROT SERPL-MCNC: 7 G/DL (ref 6.1–8.1)
SODIUM SERPL-SCNC: 141 MMOL/L (ref 135–146)
TRIGL SERPL-MCNC: 188 MG/DL

## 2023-08-09 ENCOUNTER — OFFICE VISIT (OUTPATIENT)
Dept: FAMILY MEDICINE | Facility: CLINIC | Age: 73
End: 2023-08-09
Payer: MEDICARE

## 2023-08-09 VITALS
HEART RATE: 90 BPM | WEIGHT: 248 LBS | BODY MASS INDEX: 39.86 KG/M2 | DIASTOLIC BLOOD PRESSURE: 88 MMHG | HEIGHT: 66 IN | SYSTOLIC BLOOD PRESSURE: 138 MMHG

## 2023-08-09 DIAGNOSIS — E78.2 MIXED HYPERLIPIDEMIA: ICD-10-CM

## 2023-08-09 DIAGNOSIS — M75.41 ROTATOR CUFF IMPINGEMENT SYNDROME OF RIGHT SHOULDER: ICD-10-CM

## 2023-08-09 DIAGNOSIS — E66.01 MORBID OBESITY: ICD-10-CM

## 2023-08-09 DIAGNOSIS — E06.3 AUTOIMMUNE THYROIDITIS: ICD-10-CM

## 2023-08-09 DIAGNOSIS — E06.3 HYPOTHYROIDISM DUE TO HASHIMOTO'S THYROIDITIS: ICD-10-CM

## 2023-08-09 DIAGNOSIS — Z96.643 PRESENCE OF ARTIFICIAL HIP JOINT, BILATERAL: ICD-10-CM

## 2023-08-09 DIAGNOSIS — Z78.0 MENOPAUSE: ICD-10-CM

## 2023-08-09 DIAGNOSIS — E03.8 HYPOTHYROIDISM DUE TO HASHIMOTO'S THYROIDITIS: ICD-10-CM

## 2023-08-09 DIAGNOSIS — I10 ESSENTIAL HYPERTENSION: Primary | ICD-10-CM

## 2023-08-09 PROCEDURE — 1159F PR MEDICATION LIST DOCUMENTED IN MEDICAL RECORD: ICD-10-PCS | Mod: CPTII,S$GLB,, | Performed by: FAMILY MEDICINE

## 2023-08-09 PROCEDURE — 1157F PR ADVANCE CARE PLAN OR EQUIV PRESENT IN MEDICAL RECORD: ICD-10-PCS | Mod: CPTII,S$GLB,, | Performed by: FAMILY MEDICINE

## 2023-08-09 PROCEDURE — 3288F PR FALLS RISK ASSESSMENT DOCUMENTED: ICD-10-PCS | Mod: CPTII,S$GLB,, | Performed by: FAMILY MEDICINE

## 2023-08-09 PROCEDURE — 3060F POS MICROALBUMINURIA REV: CPT | Mod: CPTII,S$GLB,, | Performed by: FAMILY MEDICINE

## 2023-08-09 PROCEDURE — 3288F FALL RISK ASSESSMENT DOCD: CPT | Mod: CPTII,S$GLB,, | Performed by: FAMILY MEDICINE

## 2023-08-09 PROCEDURE — 1101F PR PT FALLS ASSESS DOC 0-1 FALLS W/OUT INJ PAST YR: ICD-10-PCS | Mod: CPTII,S$GLB,, | Performed by: FAMILY MEDICINE

## 2023-08-09 PROCEDURE — 3066F PR DOCUMENTATION OF TREATMENT FOR NEPHROPATHY: ICD-10-PCS | Mod: CPTII,S$GLB,, | Performed by: FAMILY MEDICINE

## 2023-08-09 PROCEDURE — 99214 OFFICE O/P EST MOD 30 MIN: CPT | Mod: S$GLB,,, | Performed by: FAMILY MEDICINE

## 2023-08-09 PROCEDURE — 1101F PT FALLS ASSESS-DOCD LE1/YR: CPT | Mod: CPTII,S$GLB,, | Performed by: FAMILY MEDICINE

## 2023-08-09 PROCEDURE — 3079F PR MOST RECENT DIASTOLIC BLOOD PRESSURE 80-89 MM HG: ICD-10-PCS | Mod: CPTII,S$GLB,, | Performed by: FAMILY MEDICINE

## 2023-08-09 PROCEDURE — 1157F ADVNC CARE PLAN IN RCRD: CPT | Mod: CPTII,S$GLB,, | Performed by: FAMILY MEDICINE

## 2023-08-09 PROCEDURE — 1159F MED LIST DOCD IN RCRD: CPT | Mod: CPTII,S$GLB,, | Performed by: FAMILY MEDICINE

## 2023-08-09 PROCEDURE — 3060F PR POS MICROALBUMINURIA RESULT DOCUMENTED/REVIEW: ICD-10-PCS | Mod: CPTII,S$GLB,, | Performed by: FAMILY MEDICINE

## 2023-08-09 PROCEDURE — 3075F SYST BP GE 130 - 139MM HG: CPT | Mod: CPTII,S$GLB,, | Performed by: FAMILY MEDICINE

## 2023-08-09 PROCEDURE — 3079F DIAST BP 80-89 MM HG: CPT | Mod: CPTII,S$GLB,, | Performed by: FAMILY MEDICINE

## 2023-08-09 PROCEDURE — 3066F NEPHROPATHY DOC TX: CPT | Mod: CPTII,S$GLB,, | Performed by: FAMILY MEDICINE

## 2023-08-09 PROCEDURE — 3008F PR BODY MASS INDEX (BMI) DOCUMENTED: ICD-10-PCS | Mod: CPTII,S$GLB,, | Performed by: FAMILY MEDICINE

## 2023-08-09 PROCEDURE — 99214 PR OFFICE/OUTPT VISIT, EST, LEVL IV, 30-39 MIN: ICD-10-PCS | Mod: S$GLB,,, | Performed by: FAMILY MEDICINE

## 2023-08-09 PROCEDURE — 3075F PR MOST RECENT SYSTOLIC BLOOD PRESS GE 130-139MM HG: ICD-10-PCS | Mod: CPTII,S$GLB,, | Performed by: FAMILY MEDICINE

## 2023-08-09 PROCEDURE — 3008F BODY MASS INDEX DOCD: CPT | Mod: CPTII,S$GLB,, | Performed by: FAMILY MEDICINE

## 2023-08-09 NOTE — PROGRESS NOTES
SUBJECTIVE:    Patient ID: Ligia Krishnan is a 73 y.o. female.    Chief Complaint: Osteoarthritis (Went over meds verbally, multiple joins pain, review Lab-results, brought BP machine to equilibrate, brought BP record electronic, abc )    73-year-old female here for six-month checkup.  She is had bilateral hip replacements and they are not painful.  She does have arthritis in the knees and shoulder regions.  She was taken off of NSAIDs and Aleve due to chronic kidney disease stage 3.  She now uses Arthritis Strength Tylenol 2 tablets t.i.d..  She can walk well for about 20 minutes.  She does have upper and lower back pains and occasional sciatica.    She is a nonsmoker and does not drink significant alcohol    GERD-controlled with Nexium 20 mg daily    07/2023-colonoscopy with Dr. [facility]one[facility] 5 years    06/2023-mammogram and breast ultrasounds for fibrocystic disease, Dr. Tinoco orders this.    Up-to-date on immunizations        Patient Outreach on 07/31/2023   Component Date Value Ref Range Status    CRC Recommendation External 07/28/2023 Repeat colonoscopy in 5 years   Final       Past Medical History:   Diagnosis Date    Chronic renal insufficiency, stage III (moderate) 05/16/2017    Esophageal reflux     Hyperlipidemia     Hypertension     OA (osteoarthritis) of hip     bilateral    Personal history of colonic polyps 07/28/2023    Colonoscopy Kobi Gonzalez MD     Social History     Socioeconomic History    Marital status:    Tobacco Use    Smoking status: Never    Smokeless tobacco: Never   Substance and Sexual Activity    Alcohol use: Yes     Alcohol/week: 1.0 standard drink of alcohol     Types: 1 Glasses of wine per week     Comment: 1 per month    Drug use: Never    Sexual activity: Not Currently     Partners: Male     Birth control/protection: Partner-Vasectomy, Post-menopausal     Social Determinants of Health     Financial Resource Strain: Low Risk  (8/6/2023)    Overall Financial Resource  Strain (CARDIA)     Difficulty of Paying Living Expenses: Not hard at all   Food Insecurity: No Food Insecurity (2023)    Hunger Vital Sign     Worried About Running Out of Food in the Last Year: Never true     Ran Out of Food in the Last Year: Never true   Transportation Needs: No Transportation Needs (2023)    PRAPARE - Transportation     Lack of Transportation (Medical): No     Lack of Transportation (Non-Medical): No   Physical Activity: Inactive (2023)    Exercise Vital Sign     Days of Exercise per Week: 0 days     Minutes of Exercise per Session: 30 min   Stress: No Stress Concern Present (2023)    [employer] - Occupational Stress Questionnaire     Feeling of Stress : Not at all   Social Connections: Unknown (2023)    Social Connection and Isolation Panel [NHANES]     Frequency of Communication with Friends and Family: More than three times a week     Frequency of Social Gatherings with Friends and Family: Never     Active Member of Clubs or Organizations: Yes     Attends Club or Organization Meetings: 1 to 4 times per year     Marital Status:    Housing Stability: Low Risk  (2023)    Housing Stability Vital Sign     Unable to Pay for Housing in the Last Year: No     Number of Places Lived in the Last Year: 1     Unstable Housing in the Last Year: No     Past Surgical History:   Procedure Laterality Date    APPENDECTOMY      BREAST BIOPSY      BREAST CYST ASPIRATION      CATARACT EXTRACTION Left 10/2018    COLONOSCOPY  2016    Dr. -[facility] 7 years    EYE SURGERY      Lasix; laser tear in retina; macular pucker repair; cataract    JOINT REPLACEMENT Right 13    (R) DAPHNEY    JOINT REPLACEMENT Left 10/18/12    (L) DAPHNEY     Family History   Problem Relation Age of Onset    Arthritis Mother             Heart disease Mother         Hypertension, arrythmia, congestive heart failure    Hypertension Mother     Kidney disease Mother          Low function    Alcohol abuse Father             Cancer Father         Heavy smoker, squamous cell nasal    COPD Father         Was on oxygen    Heart disease Father         Heart attach at 54    Breast cancer Sister     Heart disease Sister         Broken heart syndrome    Depression Daughter         Dx with PCOS    Hearing loss Daughter         Left ear    Alcohol abuse Maternal Grandmother             Arthritis Maternal Grandmother     COPD Maternal Grandmother         Smoker    Learning disabilities Son         ADHD    Melanoma Neg Hx     Lupus Neg Hx     Eczema Neg Hx     Psoriasis Neg Hx        The 10-year CVD risk score (Beni et al., 2008) is: 12.1%    Values used to calculate the score:      Age: 73 years      Sex: Female      Diabetic: No      Tobacco smoker: No      Systolic Blood Pressure: 138 mmHg      Is BP treated: Yes      HDL Cholesterol: 71 mg/dL      Total Cholesterol: 183 mg/dL    All of your core healthy metrics are met.      Review of patient's allergies indicates:   Allergen Reactions    Morphine Nausea And Vomiting       Current Outpatient Medications:     acetaminophen (TYLENOL) 650 MG TbSR, , Disp: , Rfl:     amLODIPine (NORVASC) 5 MG tablet, Take 1 tablet (5 mg total) by mouth once daily., Disp: 90 tablet, Rfl: 3    APPLE CIDER VINEGAR ORAL, Take by mouth., Disp: , Rfl:     b complex vitamins tablet, Take 1 tablet by mouth once daily., Disp: , Rfl:     biotin 10,000 mcg Cap, Take 1 capsule by mouth once daily. , Disp: , Rfl:     esomeprazole (NEXIUM) 20 MG capsule, Take 1 capsule (20 mg total) by mouth before breakfast., Disp: 90 capsule, Rfl: 1    estradiol (ESTRACE) 0.5 MG tablet, Take 0.25 mg by mouth once daily. , Disp: , Rfl: 0    hydroCHLOROthiazide (HYDRODIURIL) 25 MG tablet, Take 1 tablet (25 mg total) by mouth once daily., Disp: 90 tablet, Rfl: 3    LACTOBACILLUS ACIDOPHILUS (PROBIOTIC ORAL), Take by mouth., Disp: , Rfl:     melatonin 10 mg Tab, Take 10 mg by  "mouth once daily., Disp: , Rfl:     metoprolol succinate (TOPROL-XL) 25 MG 24 hr tablet, Take 1 tablet (25 mg total) by mouth 2 (two) times daily., Disp: 180 tablet, Rfl: 3    multivitamin capsule, Take 1 capsule by mouth daily as needed. , Disp: , Rfl:     oxybutynin (DITROPAN-XL) 10 MG 24 hr tablet, Take 10 mg by mouth once daily., Disp: , Rfl:     pravastatin (PRAVACHOL) 40 MG tablet, Take 1 tablet (40 mg total) by mouth once daily., Disp: 90 tablet, Rfl: 3    progesterone (PROMETRIUM) 200 MG capsule, Take 100 mg by mouth every evening., Disp: , Rfl:     selenium 200 mcg Cap, Take by mouth., Disp: , Rfl:     SYNTHROID 25 mcg tablet, , Disp: , Rfl:     Review of Systems   Constitutional:  Negative for appetite change, chills, fatigue, fever and unexpected weight change.   HENT:  Negative for ear discharge and ear pain.    Eyes:  Negative for pain, discharge and visual disturbance.   Respiratory:  Negative for apnea, cough, shortness of breath and wheezing.    Cardiovascular:  Negative for chest pain, palpitations and leg swelling.   Gastrointestinal:  Positive for reflux. Negative for abdominal pain, blood in stool, constipation, diarrhea, nausea and vomiting.   Endocrine: Negative for cold intolerance, heat intolerance and polydipsia.   Genitourinary:  Positive for bladder incontinence and hot flashes. Negative for dysuria, hematuria, nocturia and urgency.   Musculoskeletal:  Positive for arthralgias and back pain. Negative for gait problem, joint swelling and myalgias.   Neurological:  Negative for dizziness, seizures and numbness.   Psychiatric/Behavioral:  Negative for behavioral problems, hallucinations and sleep disturbance. The patient is not nervous/anxious.            Objective:      Vitals:    08/09/23 0953   BP: 138/88   Pulse: 90   Weight: 112.5 kg (248 lb)   Height: 5' 5.5" (1.664 m)     Physical Exam  Vitals and nursing note reviewed.   Constitutional:       General: She is not in acute distress.     " Appearance: She is well-developed. She is obese. She is not toxic-appearing.   HENT:      Head: Normocephalic and atraumatic.      Right Ear: Tympanic membrane and external ear normal.      Left Ear: Tympanic membrane and external ear normal.      Nose: Nose normal.      Mouth/Throat:      Pharynx: Oropharynx is clear.   Eyes:      Pupils: Pupils are equal, round, and reactive to light.   Neck:      Thyroid: No thyromegaly.      Vascular: No carotid bruit.   Cardiovascular:      Rate and Rhythm: Normal rate and regular rhythm.      Heart sounds: Normal heart sounds. No murmur heard.  Pulmonary:      Effort: Pulmonary effort is normal.      Breath sounds: Normal breath sounds. No wheezing or rales.   Abdominal:      General: Bowel sounds are normal. There is no distension.      Palpations: Abdomen is soft.      Tenderness: There is no abdominal tenderness.   Musculoskeletal:         General: No tenderness or deformity. Normal range of motion.      Cervical back: Normal range of motion and neck supple.      Lumbar back: Normal. No spasms.      Comments: Bends 90 degrees at  waist, shoulders have good range of motion knees are crepitant bilaterally.  No pitting edema to lower extremities   Lymphadenopathy:      Cervical: No cervical adenopathy.   Skin:     General: Skin is warm and dry.      Findings: No rash.   Neurological:      Mental Status: She is alert and oriented to person, place, and time. Mental status is at baseline.      Cranial Nerves: No cranial nerve deficit.      Coordination: Coordination normal.      Gait: Gait normal.   Psychiatric:         Mood and Affect: Mood normal.         Behavior: Behavior normal.         Thought Content: Thought content normal.         Judgment: Judgment normal.           Assessment:       1. Essential hypertension    2. Mixed hyperlipidemia    3. Menopause    4. Autoimmune thyroiditis    5. Hypothyroidism due to Hashimoto's thyroiditis    6. Morbid obesity    7. Presence of  artificial hip joint, bilateral    8. Rotator cuff impingement syndrome of right shoulder         Plan:       Essential hypertension  -     CBC Auto Differential; Future; Expected date: 08/09/2023  -     Comprehensive Metabolic Panel; Future; Expected date: 08/09/2023  -     Lipid Panel; Future; Expected date: 08/09/2023  -     Microalbumin/Creatinine Ratio, Urine; Future; Expected date: 08/09/2023  -     Urinalysis, Reflex to Urine Culture Urine, Clean Catch; Future; Expected date: 08/09/2023  -     TSH w/reflex to FT4; Future; Expected date: 08/09/2023  Blood pressure well controlled, we will repeat lab work in 6 months.  Mixed hyperlipidemia  Cholesterol 183 HDL 71, LDL 84  excellent lipid panel  Menopause  Continues with hormones from gyn  Autoimmune thyroiditis  Sees Dr. Hernandez endocrine in [place]  Hypothyroidism due to Hashimoto's thyroiditis    Morbid obesity  Gained 4 lb due to her 's baking cakes  Presence of artificial hip joint, bilateral    Rotator cuff impingement syndrome of right shoulder  No need for surgery at this time    No follow-ups on file.        8/9/2023 Hemant Zhang

## 2023-11-03 ENCOUNTER — PATIENT MESSAGE (OUTPATIENT)
Dept: ADMINISTRATIVE | Facility: OTHER | Age: 73
End: 2023-11-03
Payer: MEDICARE

## 2024-02-06 ENCOUNTER — PATIENT MESSAGE (OUTPATIENT)
Dept: FAMILY MEDICINE | Facility: CLINIC | Age: 74
End: 2024-02-06
Payer: MEDICARE

## 2024-02-06 DIAGNOSIS — Z12.31 ENCOUNTER FOR SCREENING MAMMOGRAM FOR MALIGNANT NEOPLASM OF BREAST: Primary | ICD-10-CM

## 2024-02-06 DIAGNOSIS — E55.9 VITAMIN D DEFICIENCY: Primary | ICD-10-CM

## 2024-02-15 LAB
25(OH)D3 SERPL-MCNC: 79 NG/ML (ref 30–100)
ALBUMIN SERPL-MCNC: 4.5 G/DL (ref 3.6–5.1)
ALBUMIN/GLOB SERPL: 1.7 (CALC) (ref 1–2.5)
ALP SERPL-CCNC: 73 U/L (ref 37–153)
ALT SERPL-CCNC: 23 U/L (ref 6–29)
AST SERPL-CCNC: 20 U/L (ref 10–35)
BASOPHILS # BLD AUTO: 58 CELLS/UL (ref 0–200)
BASOPHILS NFR BLD AUTO: 0.8 %
BILIRUB SERPL-MCNC: 0.5 MG/DL (ref 0.2–1.2)
BUN SERPL-MCNC: 20 MG/DL (ref 7–25)
BUN/CREAT SERPL: ABNORMAL (CALC) (ref 6–22)
CALCIUM SERPL-MCNC: 10.2 MG/DL (ref 8.6–10.4)
CHLORIDE SERPL-SCNC: 100 MMOL/L (ref 98–110)
CHOLEST SERPL-MCNC: 187 MG/DL
CHOLEST/HDLC SERPL: 2.5 (CALC)
CO2 SERPL-SCNC: 32 MMOL/L (ref 20–32)
CREAT SERPL-MCNC: 0.94 MG/DL (ref 0.6–1)
EGFR: 65 ML/MIN/1.73M2
EOSINOPHIL # BLD AUTO: 238 CELLS/UL (ref 15–500)
EOSINOPHIL NFR BLD AUTO: 3.3 %
ERYTHROCYTE [DISTWIDTH] IN BLOOD BY AUTOMATED COUNT: 13.3 % (ref 11–15)
GLOBULIN SER CALC-MCNC: 2.6 G/DL (CALC) (ref 1.9–3.7)
GLUCOSE SERPL-MCNC: 106 MG/DL (ref 65–99)
HCT VFR BLD AUTO: 41.1 % (ref 35–45)
HDLC SERPL-MCNC: 75 MG/DL
HGB BLD-MCNC: 14.2 G/DL (ref 11.7–15.5)
LDLC SERPL CALC-MCNC: 84 MG/DL (CALC)
LYMPHOCYTES # BLD AUTO: 2678 CELLS/UL (ref 850–3900)
LYMPHOCYTES NFR BLD AUTO: 37.2 %
MCH RBC QN AUTO: 31.3 PG (ref 27–33)
MCHC RBC AUTO-ENTMCNC: 34.5 G/DL (ref 32–36)
MCV RBC AUTO: 90.5 FL (ref 80–100)
MONOCYTES # BLD AUTO: 518 CELLS/UL (ref 200–950)
MONOCYTES NFR BLD AUTO: 7.2 %
NEUTROPHILS # BLD AUTO: 3708 CELLS/UL (ref 1500–7800)
NEUTROPHILS NFR BLD AUTO: 51.5 %
NONHDLC SERPL-MCNC: 112 MG/DL (CALC)
PLATELET # BLD AUTO: 285 THOUSAND/UL (ref 140–400)
PMV BLD REES-ECKER: 10.3 FL (ref 7.5–12.5)
POTASSIUM SERPL-SCNC: 4.2 MMOL/L (ref 3.5–5.3)
PROT SERPL-MCNC: 7.1 G/DL (ref 6.1–8.1)
RBC # BLD AUTO: 4.54 MILLION/UL (ref 3.8–5.1)
SODIUM SERPL-SCNC: 140 MMOL/L (ref 135–146)
TRIGL SERPL-MCNC: 184 MG/DL
TSH SERPL-ACNC: 2.69 MIU/L (ref 0.4–4.5)
WBC # BLD AUTO: 7.2 THOUSAND/UL (ref 3.8–10.8)

## 2024-02-16 LAB
ALBUMIN/CREAT UR: 2 MCG/MG CREAT
APPEARANCE UR: CLEAR
BACTERIA #/AREA URNS HPF: NORMAL /HPF
BACTERIA UR CULT: NORMAL
BILIRUB UR QL STRIP: NEGATIVE
COLOR UR: NORMAL
CREAT UR-MCNC: 91 MG/DL (ref 20–275)
GLUCOSE UR QL STRIP: NEGATIVE
HGB UR QL STRIP: NEGATIVE
HYALINE CASTS #/AREA URNS LPF: NORMAL /LPF
KETONES UR QL STRIP: NEGATIVE
LEUKOCYTE ESTERASE UR QL STRIP: NEGATIVE
MICROALBUMIN UR-MCNC: 0.2 MG/DL
NITRITE UR QL STRIP: NEGATIVE
PH UR STRIP: 5.5 [PH] (ref 5–8)
PROT UR QL STRIP: NEGATIVE
RBC #/AREA URNS HPF: NORMAL /HPF
SERVICE CMNT-IMP: NORMAL
SP GR UR STRIP: 1.02 (ref 1–1.03)
SQUAMOUS #/AREA URNS HPF: NORMAL /HPF
WBC #/AREA URNS HPF: NORMAL /HPF

## 2024-02-20 ENCOUNTER — OFFICE VISIT (OUTPATIENT)
Dept: FAMILY MEDICINE | Facility: CLINIC | Age: 74
End: 2024-02-20
Attending: FAMILY MEDICINE
Payer: MEDICARE

## 2024-02-20 VITALS
SYSTOLIC BLOOD PRESSURE: 120 MMHG | HEIGHT: 65 IN | DIASTOLIC BLOOD PRESSURE: 86 MMHG | HEART RATE: 95 BPM | BODY MASS INDEX: 40.32 KG/M2 | WEIGHT: 242 LBS

## 2024-02-20 DIAGNOSIS — E78.5 HYPERLIPIDEMIA, UNSPECIFIED HYPERLIPIDEMIA TYPE: ICD-10-CM

## 2024-02-20 DIAGNOSIS — Z96.643 PRESENCE OF ARTIFICIAL HIP JOINT, BILATERAL: ICD-10-CM

## 2024-02-20 DIAGNOSIS — E06.3 HYPOTHYROIDISM DUE TO HASHIMOTO'S THYROIDITIS: ICD-10-CM

## 2024-02-20 DIAGNOSIS — M75.41 ROTATOR CUFF IMPINGEMENT SYNDROME OF RIGHT SHOULDER: Primary | ICD-10-CM

## 2024-02-20 DIAGNOSIS — M17.0 PRIMARY OSTEOARTHRITIS OF BOTH KNEES: ICD-10-CM

## 2024-02-20 DIAGNOSIS — E03.8 HYPOTHYROIDISM DUE TO HASHIMOTO'S THYROIDITIS: ICD-10-CM

## 2024-02-20 DIAGNOSIS — E66.01 MORBID OBESITY: ICD-10-CM

## 2024-02-20 DIAGNOSIS — Z78.0 MENOPAUSE: ICD-10-CM

## 2024-02-20 DIAGNOSIS — E06.3 AUTOIMMUNE THYROIDITIS: ICD-10-CM

## 2024-02-20 DIAGNOSIS — E78.2 MIXED HYPERLIPIDEMIA: ICD-10-CM

## 2024-02-20 DIAGNOSIS — K21.9 GASTRO-ESOPHAGEAL REFLUX DISEASE WITHOUT ESOPHAGITIS: ICD-10-CM

## 2024-02-20 DIAGNOSIS — M15.9 PRIMARY OSTEOARTHRITIS INVOLVING MULTIPLE JOINTS: ICD-10-CM

## 2024-02-20 DIAGNOSIS — N39.41 URGE URINARY INCONTINENCE: ICD-10-CM

## 2024-02-20 DIAGNOSIS — I10 ESSENTIAL HYPERTENSION: ICD-10-CM

## 2024-02-20 PROBLEM — M15.0 PRIMARY OSTEOARTHRITIS INVOLVING MULTIPLE JOINTS: Status: ACTIVE | Noted: 2024-02-20

## 2024-02-20 PROCEDURE — 1125F AMNT PAIN NOTED PAIN PRSNT: CPT | Mod: CPTII,S$GLB,, | Performed by: FAMILY MEDICINE

## 2024-02-20 PROCEDURE — 3288F FALL RISK ASSESSMENT DOCD: CPT | Mod: CPTII,S$GLB,, | Performed by: FAMILY MEDICINE

## 2024-02-20 PROCEDURE — 1159F MED LIST DOCD IN RCRD: CPT | Mod: CPTII,S$GLB,, | Performed by: FAMILY MEDICINE

## 2024-02-20 PROCEDURE — 1157F ADVNC CARE PLAN IN RCRD: CPT | Mod: CPTII,S$GLB,, | Performed by: FAMILY MEDICINE

## 2024-02-20 PROCEDURE — 3008F BODY MASS INDEX DOCD: CPT | Mod: CPTII,S$GLB,, | Performed by: FAMILY MEDICINE

## 2024-02-20 PROCEDURE — 3066F NEPHROPATHY DOC TX: CPT | Mod: CPTII,S$GLB,, | Performed by: FAMILY MEDICINE

## 2024-02-20 PROCEDURE — 3074F SYST BP LT 130 MM HG: CPT | Mod: CPTII,S$GLB,, | Performed by: FAMILY MEDICINE

## 2024-02-20 PROCEDURE — 99214 OFFICE O/P EST MOD 30 MIN: CPT | Mod: S$GLB,,, | Performed by: FAMILY MEDICINE

## 2024-02-20 PROCEDURE — 1101F PT FALLS ASSESS-DOCD LE1/YR: CPT | Mod: CPTII,S$GLB,, | Performed by: FAMILY MEDICINE

## 2024-02-20 PROCEDURE — 3061F NEG MICROALBUMINURIA REV: CPT | Mod: CPTII,S$GLB,, | Performed by: FAMILY MEDICINE

## 2024-02-20 PROCEDURE — 3079F DIAST BP 80-89 MM HG: CPT | Mod: CPTII,S$GLB,, | Performed by: FAMILY MEDICINE

## 2024-02-20 RX ORDER — HYDROCHLOROTHIAZIDE 25 MG/1
25 TABLET ORAL DAILY
Qty: 90 TABLET | Refills: 3 | Status: SHIPPED | OUTPATIENT
Start: 2024-02-20 | End: 2025-02-19

## 2024-02-20 RX ORDER — PRAVASTATIN SODIUM 40 MG/1
40 TABLET ORAL DAILY
Qty: 90 TABLET | Refills: 3 | Status: SHIPPED | OUTPATIENT
Start: 2024-02-20

## 2024-02-20 RX ORDER — METOPROLOL SUCCINATE 25 MG/1
25 TABLET, EXTENDED RELEASE ORAL 2 TIMES DAILY
Qty: 180 TABLET | Refills: 3 | Status: SHIPPED | OUTPATIENT
Start: 2024-02-20 | End: 2025-02-19

## 2024-02-20 RX ORDER — AMLODIPINE BESYLATE 5 MG/1
5 TABLET ORAL DAILY
Qty: 90 TABLET | Refills: 3 | Status: SHIPPED | OUTPATIENT
Start: 2024-02-20 | End: 2024-03-21

## 2024-02-25 NOTE — PROGRESS NOTES
SUBJECTIVE:    Patient ID: Ligia Krishnan is a 73 y.o. female.    Chief Complaint: Hyperlipidemia (No bottles, review Lab-results,review Dexa result, bilateral shoulder pain, abc )    73-year-old female here for six-month checkup.  She states her shoulders ache.  Tylenol 2 tablets every 8 hours seems to help.  She is unable to sleep on his shoulders at night.    Her knees also ache, worse on cold days or with bad weather.    GERD-using Nexium 20 mg b.i.d..    July 2023-colonoscopy with Dr. Barone-RTC 5 years    Mixed incontinence-oxybutynin ER 10 mg daily, side effects include dry mouth.  She does complain of some dry eyes and uses fish oil capsules t.i.d.    Dermatology-Dr. Reyes, sees her in May of 2024.    Hyperlipidemia  Pertinent negatives include no chest pain.       No visits with results within 6 Month(s) from this visit.   Latest known visit with results is:   Office Visit on 08/09/2023   Component Date Value Ref Range Status    WBC 02/14/2024 7.2  3.8 - 10.8 Thousand/uL Final    RBC 02/14/2024 4.54  3.80 - 5.10 Million/uL Final    Hemoglobin 02/14/2024 14.2  11.7 - 15.5 g/dL Final    Hematocrit 02/14/2024 41.1  35.0 - 45.0 % Final    MCV 02/14/2024 90.5  80.0 - 100.0 fL Final    MCH 02/14/2024 31.3  27.0 - 33.0 pg Final    MCHC 02/14/2024 34.5  32.0 - 36.0 g/dL Final    RDW 02/14/2024 13.3  11.0 - 15.0 % Final    Platelets 02/14/2024 285  140 - 400 Thousand/uL Final    MPV 02/14/2024 10.3  7.5 - 12.5 fL Final    Neutrophils, Abs 02/14/2024 3,708  1,500 - 7,800 cells/uL Final    Lymph # 02/14/2024 2,678  850 - 3,900 cells/uL Final    Mono # 02/14/2024 518  200 - 950 cells/uL Final    Eos # 02/14/2024 238  15 - 500 cells/uL Final    Baso # 02/14/2024 58  0 - 200 cells/uL Final    Neutrophils Relative 02/14/2024 51.5  % Final    Lymph % 02/14/2024 37.2  % Final    Mono % 02/14/2024 7.2  % Final    Eosinophil % 02/14/2024 3.3  % Final    Basophil % 02/14/2024 0.8  % Final    Glucose 02/14/2024 106  (H)  65 - 99 mg/dL Final    BUN 02/14/2024 20  7 - 25 mg/dL Final    Creatinine 02/14/2024 0.94  0.60 - 1.00 mg/dL Final    eGFR 02/14/2024 65  > OR = 60 mL/min/1.73m2 Final    BUN/Creatinine Ratio 02/14/2024 SEE NOTE:  6 - 22 (calc) Final    Sodium 02/14/2024 140  135 - 146 mmol/L Final    Potassium 02/14/2024 4.2  3.5 - 5.3 mmol/L Final    Chloride 02/14/2024 100  98 - 110 mmol/L Final    CO2 02/14/2024 32  20 - 32 mmol/L Final    Calcium 02/14/2024 10.2  8.6 - 10.4 mg/dL Final    Total Protein 02/14/2024 7.1  6.1 - 8.1 g/dL Final    Albumin 02/14/2024 4.5  3.6 - 5.1 g/dL Final    Globulin, Total 02/14/2024 2.6  1.9 - 3.7 g/dL (calc) Final    Albumin/Globulin Ratio 02/14/2024 1.7  1.0 - 2.5 (calc) Final    Total Bilirubin 02/14/2024 0.5  0.2 - 1.2 mg/dL Final    Alkaline Phosphatase 02/14/2024 73  37 - 153 U/L Final    AST 02/14/2024 20  10 - 35 U/L Final    ALT 02/14/2024 23  6 - 29 U/L Final    Cholesterol 02/14/2024 187  <200 mg/dL Final    HDL 02/14/2024 75  > OR = 50 mg/dL Final    Triglycerides 02/14/2024 184 (H)  <150 mg/dL Final    LDL Cholesterol 02/14/2024 84  mg/dL (calc) Final    HDL/Cholesterol Ratio 02/14/2024 2.5  <5.0 (calc) Final    Non HDL Chol. (LDL+VLDL) 02/14/2024 112  <130 mg/dL (calc) Final    Creatinine, Urine 02/15/2024 91  20 - 275 mg/dL Final    Microalb, Ur 02/15/2024 0.2  See Note: mg/dL Final    Microalb/Creat Ratio 02/15/2024 2  <30 mcg/mg creat Final    Color, UA 02/15/2024 DARK YELLOW  YELLOW Final    Appearance, UA 02/15/2024 CLEAR  CLEAR Final    Specific Gravity, UA 02/15/2024 1.016  1.001 - 1.035 Final    pH, UA 02/15/2024 5.5  5.0 - 8.0 Final    Glucose, UA 02/15/2024 NEGATIVE  NEGATIVE Final    Bilirubin, UA 02/15/2024 NEGATIVE  NEGATIVE Final    Ketones, UA 02/15/2024 NEGATIVE  NEGATIVE Final    Occult Blood UA 02/15/2024 NEGATIVE  NEGATIVE Final    Protein, UA 02/15/2024 NEGATIVE  NEGATIVE Final    Nitrite, UA 02/15/2024 NEGATIVE  NEGATIVE Final    Leukocytes, UA 02/15/2024  NEGATIVE  NEGATIVE Final    WBC Casts, UA 02/15/2024 NONE SEEN  < OR = 5 /HPF Final    RBC Casts, UA 02/15/2024 NONE SEEN  < OR = 2 /HPF Final    Squam Epithel, UA 02/15/2024 NONE SEEN  < OR = 5 /HPF Final    Bacteria, UA 02/15/2024 NONE SEEN  NONE SEEN /HPF Final    Hyaline Casts, UA 02/15/2024 NONE SEEN  NONE SEEN /LPF Final    Service Cmt: 02/15/2024    Final    Reflexive Urine Culture 02/15/2024    Final    TSH w/reflex to FT4 02/14/2024 2.69  0.40 - 4.50 mIU/L Final    Vitamin D, 25-OH, Total 02/14/2024 79  30 - 100 ng/mL Final       Past Medical History:   Diagnosis Date    Chronic renal insufficiency, stage III (moderate) 05/16/2017    Esophageal reflux     Hyperlipidemia     Hypertension     OA (osteoarthritis) of hip     bilateral    Personal history of colonic polyps 07/28/2023    Colonoscopy Kobi Gonzalez MD     Social History     Socioeconomic History    Marital status:    Tobacco Use    Smoking status: Never    Smokeless tobacco: Never   Substance and Sexual Activity    Alcohol use: Yes     Alcohol/week: 1.0 standard drink of alcohol     Types: 1 Glasses of wine per week     Comment: 1 per month    Drug use: Never    Sexual activity: Not Currently     Partners: Male     Birth control/protection: Partner-Vasectomy, Post-menopausal     Social Determinants of Health     Financial Resource Strain: Low Risk  (2/13/2024)    Overall Financial Resource Strain (CARDIA)     Difficulty of Paying Living Expenses: Not hard at all   Food Insecurity: No Food Insecurity (2/13/2024)    Hunger Vital Sign     Worried About Running Out of Food in the Last Year: Never true     Ran Out of Food in the Last Year: Never true   Transportation Needs: No Transportation Needs (2/13/2024)    PRAPARE - Transportation     Lack of Transportation (Medical): No     Lack of Transportation (Non-Medical): No   Physical Activity: Inactive (2/13/2024)    Exercise Vital Sign     Days of Exercise per Week: 0 days     Minutes of  Exercise per Session: 0 min   Stress: No Stress Concern Present (2024)    [employer] - Occupational Stress Questionnaire     Feeling of Stress : Not at all   Social Connections: Unknown (2024)    Social Connection and Isolation Panel [NHANES]     Frequency of Communication with Friends and Family: Three times a week     Frequency of Social Gatherings with Friends and Family: Patient declined     Active Member of Clubs or Organizations: Yes     Attends Club or Organization Meetings: Never     Marital Status:    Housing Stability: Low Risk  (2024)    Housing Stability Vital Sign     Unable to Pay for Housing in the Last Year: No     Number of Places Lived in the Last Year: 1     Unstable Housing in the Last Year: No     Past Surgical History:   Procedure Laterality Date    APPENDECTOMY      BREAST BIOPSY      BREAST CYST ASPIRATION      CATARACT EXTRACTION Left 10/2018    COLONOSCOPY  2016    Dr. -RTC 7 years    EYE SURGERY      Lasix; laser tear in retina; macular pucker repair; cataract    JOINT REPLACEMENT Right 13    (R) DAPHNEY    JOINT REPLACEMENT Left 10/18/12    (L) DAPHNEY     Family History   Problem Relation Age of Onset    Arthritis Mother             Heart disease Mother         Hypertension, arrythmia, congestive heart failure    Hypertension Mother     Kidney disease Mother         Low function    Alcohol abuse Father             Cancer Father         Heavy smoker, squamous cell nasal    COPD Father         Was on oxygen    Heart disease Father         Heart attach at 54    Breast cancer Sister     Heart disease Sister         Broken heart syndrome    Depression Daughter         Dx with PCOS    Hearing loss Daughter         Left ear    Alcohol abuse Maternal Grandmother             Arthritis Maternal Grandmother     COPD Maternal Grandmother         Smoker    Learning disabilities Son         ADHD    Melanoma Neg Hx      Lupus Neg Hx     Eczema Neg Hx     Psoriasis Neg Hx        The 10-year CVD risk score (JACKI'Avis, et al., 2008) is: 8.2%    Values used to calculate the score:      Age: 73 years      Sex: Female      Diabetic: No      Tobacco smoker: No      Systolic Blood Pressure: 120 mmHg      Is BP treated: Yes      HDL Cholesterol: 75 mg/dL      Total Cholesterol: 187 mg/dL    All of your core healthy metrics are met.      Review of patient's allergies indicates:   Allergen Reactions    Morphine Nausea And Vomiting       Current Outpatient Medications:     acetaminophen (TYLENOL) 650 MG TbSR, , Disp: , Rfl:     APPLE CIDER VINEGAR ORAL, Take by mouth., Disp: , Rfl:     b complex vitamins tablet, Take 1 tablet by mouth once daily., Disp: , Rfl:     biotin 10,000 mcg Cap, Take 1 capsule by mouth once daily. , Disp: , Rfl:     esomeprazole (NEXIUM) 20 MG capsule, Take 1 capsule (20 mg total) by mouth before breakfast., Disp: 90 capsule, Rfl: 1    estradiol (ESTRACE) 0.5 MG tablet, Take 0.25 mg by mouth once daily. , Disp: , Rfl: 0    LACTOBACILLUS ACIDOPHILUS (PROBIOTIC ORAL), Take by mouth., Disp: , Rfl:     melatonin 10 mg Tab, Take 10 mg by mouth once daily., Disp: , Rfl:     multivitamin capsule, Take 1 capsule by mouth daily as needed. , Disp: , Rfl:     oxybutynin (DITROPAN-XL) 10 MG 24 hr tablet, Take 10 mg by mouth once daily., Disp: , Rfl:     progesterone (PROMETRIUM) 200 MG capsule, Take 100 mg by mouth every evening., Disp: , Rfl:     selenium 200 mcg Cap, Take by mouth., Disp: , Rfl:     SYNTHROID 25 mcg tablet, , Disp: , Rfl:     amLODIPine (NORVASC) 5 MG tablet, Take 1 tablet (5 mg total) by mouth once daily., Disp: 90 tablet, Rfl: 3    hydroCHLOROthiazide (HYDRODIURIL) 25 MG tablet, Take 1 tablet (25 mg total) by mouth once daily., Disp: 90 tablet, Rfl: 3    metoprolol succinate (TOPROL-XL) 25 MG 24 hr tablet, Take 1 tablet (25 mg total) by mouth 2 (two) times daily., Disp: 180 tablet, Rfl: 3    pravastatin  "(PRAVACHOL) 40 MG tablet, Take 1 tablet (40 mg total) by mouth once daily., Disp: 90 tablet, Rfl: 3    Review of Systems   Constitutional:  Negative for activity change and unexpected weight change.   HENT:  Negative for hearing loss, rhinorrhea and trouble swallowing.    Eyes:  Negative for discharge and visual disturbance.   Respiratory:  Negative for chest tightness and wheezing.    Cardiovascular:  Negative for chest pain and palpitations.   Gastrointestinal:  Negative for blood in stool, constipation, diarrhea and vomiting.   Endocrine: Negative for polydipsia and polyuria.   Genitourinary:  Positive for bladder incontinence. Negative for difficulty urinating, dysuria, hematuria and menstrual problem.   Musculoskeletal:  Positive for arthralgias (shoulders ache bilaterally) and neck pain. Negative for joint swelling.   Neurological:  Positive for headaches. Negative for weakness.   Psychiatric/Behavioral:  Positive for dysphoric mood. Negative for confusion.            Objective:      Vitals:    02/20/24 1056   BP: 120/86   Pulse: 95   Weight: 109.8 kg (242 lb)   Height: 5' 5" (1.651 m)     Physical Exam  Vitals and nursing note reviewed.   Constitutional:       General: She is not in acute distress.     Appearance: She is well-developed. She is obese. She is not toxic-appearing.   HENT:      Head: Normocephalic and atraumatic.      Right Ear: Tympanic membrane and external ear normal.      Left Ear: Tympanic membrane and external ear normal.      Nose: Nose normal.      Mouth/Throat:      Pharynx: Oropharynx is clear.   Eyes:      Pupils: Pupils are equal, round, and reactive to light.   Neck:      Thyroid: No thyromegaly.      Vascular: No carotid bruit.   Cardiovascular:      Rate and Rhythm: Normal rate and regular rhythm.      Heart sounds: Normal heart sounds. No murmur heard.  Pulmonary:      Effort: Pulmonary effort is normal.      Breath sounds: Normal breath sounds. No wheezing or rales.   Abdominal: "      General: Bowel sounds are normal. There is no distension.      Palpations: Abdomen is soft.      Tenderness: There is no abdominal tenderness.   Musculoskeletal:         General: No tenderness or deformity. Normal range of motion.      Cervical back: Normal range of motion and neck supple.      Lumbar back: Normal. No spasms.      Comments: Bends 90 degrees at  waist, shoulders are limited to 150° flexion bilaterally.  Knees have good range of motion without crepitance she has bilateral artificial hips.  No pitting edema to lower extremities   Lymphadenopathy:      Cervical: No cervical adenopathy.   Skin:     General: Skin is warm and dry.      Findings: No rash.   Neurological:      Mental Status: She is alert and oriented to person, place, and time. Mental status is at baseline.      Cranial Nerves: No cranial nerve deficit.      Coordination: Coordination normal.      Gait: Gait normal.   Psychiatric:         Mood and Affect: Mood normal.         Behavior: Behavior normal.         Thought Content: Thought content normal.         Judgment: Judgment normal.           Assessment:       1. Rotator cuff impingement syndrome of right shoulder    2. Presence of artificial hip joint, bilateral    3. Primary osteoarthritis of both knees    4. Hypothyroidism due to Hashimoto's thyroiditis    5. Autoimmune thyroiditis    6. Primary osteoarthritis involving multiple joints    7. Mixed hyperlipidemia    8. Essential hypertension    9. Urge urinary incontinence    10. Menopause    11. Morbid obesity    12. Hyperlipidemia, unspecified hyperlipidemia type    13. Gastro-esophageal reflux disease without esophagitis         Plan:       Rotator cuff impingement syndrome of right shoulder  Patient has rotator cuff impingement in both shoulders, discussed various options of treatment.  She prefers to try over-the-counter pain creams 1st.  After that we can consider cortisone injections are orthopedic referrals to improve her  symptoms.  Presence of artificial hip joint, bilateral  Pain-free at this time  Primary osteoarthritis of both knees  Getting by on Tylenol q.8 hours  Hypothyroidism due to Hashimoto's thyroiditis  Followed by endocrinology  Autoimmune thyroiditis    Primary osteoarthritis involving multiple joints    Mixed hyperlipidemia  -     Comprehensive Metabolic Panel; Future; Expected date: 02/20/2024  -     Lipid Panel; Future; Expected date: 02/20/2024  Cholesterol 187 HDL 75  LDL 84, excellent lipid panel.  Essential hypertension  -     metoprolol succinate (TOPROL-XL) 25 MG 24 hr tablet; Take 1 tablet (25 mg total) by mouth 2 (two) times daily.  Dispense: 180 tablet; Refill: 3  -     hydroCHLOROthiazide (HYDRODIURIL) 25 MG tablet; Take 1 tablet (25 mg total) by mouth once daily.  Dispense: 90 tablet; Refill: 3  -     amLODIPine (NORVASC) 5 MG tablet; Take 1 tablet (5 mg total) by mouth once daily.  Dispense: 90 tablet; Refill: 3  Blood pressure well controlled  Urge urinary incontinence  Continue oxybutynin ER 10 mg  Menopause    Morbid obesity    Hyperlipidemia, unspecified hyperlipidemia type  -     pravastatin (PRAVACHOL) 40 MG tablet; Take 1 tablet (40 mg total) by mouth once daily.  Dispense: 90 tablet; Refill: 3  Continue pravastatin  Gastro-esophageal reflux disease without esophagitis  Nexium 20 mg b.i.d. working well    Follow up in about 6 months (around 8/20/2024), or hld.        2/25/2024 Hemant Zhang

## 2024-02-27 DIAGNOSIS — Z00.00 ENCOUNTER FOR MEDICARE ANNUAL WELLNESS EXAM: ICD-10-CM

## 2024-06-10 ENCOUNTER — HOSPITAL ENCOUNTER (OUTPATIENT)
Dept: RADIOLOGY | Facility: HOSPITAL | Age: 74
Discharge: HOME | End: 2024-06-10
Attending: OBSTETRICS & GYNECOLOGY
Payer: MEDICARE

## 2024-06-10 VITALS — HEIGHT: 65 IN | BODY MASS INDEX: 40.32 KG/M2 | WEIGHT: 242 LBS

## 2024-06-10 DIAGNOSIS — Z12.31 ENCOUNTER FOR SCREENING MAMMOGRAM FOR MALIGNANT NEOPLASM OF BREAST: ICD-10-CM

## 2024-06-10 PROCEDURE — 77067 SCR MAMMO BI INCL CAD: CPT | Mod: 26,,, | Performed by: RADIOLOGY

## 2024-06-10 PROCEDURE — 77063 BREAST TOMOSYNTHESIS BI: CPT | Mod: TC,PO

## 2024-06-10 PROCEDURE — 77063 BREAST TOMOSYNTHESIS BI: CPT | Mod: 26,,, | Performed by: RADIOLOGY

## 2024-06-10 PROCEDURE — 77067 SCR MAMMO BI INCL CAD: CPT | Mod: TC,PO

## 2024-08-06 ENCOUNTER — PATIENT MESSAGE (OUTPATIENT)
Dept: FAMILY MEDICINE | Facility: CLINIC | Age: 74
End: 2024-08-06
Payer: MEDICARE

## 2024-08-08 ENCOUNTER — TELEPHONE (OUTPATIENT)
Dept: FAMILY MEDICINE | Facility: CLINIC | Age: 74
End: 2024-08-08
Payer: MEDICARE

## 2024-08-20 ENCOUNTER — OFFICE VISIT (OUTPATIENT)
Dept: FAMILY MEDICINE | Facility: CLINIC | Age: 74
End: 2024-08-20
Payer: MEDICARE

## 2024-08-20 ENCOUNTER — TELEPHONE (OUTPATIENT)
Dept: FAMILY MEDICINE | Facility: CLINIC | Age: 74
End: 2024-08-20

## 2024-08-20 VITALS
SYSTOLIC BLOOD PRESSURE: 132 MMHG | BODY MASS INDEX: 41.48 KG/M2 | WEIGHT: 249 LBS | HEIGHT: 65 IN | DIASTOLIC BLOOD PRESSURE: 88 MMHG | HEART RATE: 80 BPM

## 2024-08-20 DIAGNOSIS — M17.0 PRIMARY OSTEOARTHRITIS OF BOTH KNEES: ICD-10-CM

## 2024-08-20 DIAGNOSIS — M75.40 ROTATOR CUFF IMPINGEMENT SYNDROME, UNSPECIFIED LATERALITY: Primary | ICD-10-CM

## 2024-08-20 DIAGNOSIS — K21.9 GASTRO-ESOPHAGEAL REFLUX DISEASE WITHOUT ESOPHAGITIS: ICD-10-CM

## 2024-08-20 DIAGNOSIS — N39.41 URGE URINARY INCONTINENCE: ICD-10-CM

## 2024-08-20 DIAGNOSIS — M75.41 ROTATOR CUFF IMPINGEMENT SYNDROME OF RIGHT SHOULDER: ICD-10-CM

## 2024-08-20 DIAGNOSIS — E06.3 HYPOTHYROIDISM DUE TO HASHIMOTO'S THYROIDITIS: ICD-10-CM

## 2024-08-20 DIAGNOSIS — E78.2 MIXED HYPERLIPIDEMIA: ICD-10-CM

## 2024-08-20 DIAGNOSIS — Z78.0 MENOPAUSE: ICD-10-CM

## 2024-08-20 DIAGNOSIS — E06.3 AUTOIMMUNE THYROIDITIS: ICD-10-CM

## 2024-08-20 DIAGNOSIS — M15.9 PRIMARY OSTEOARTHRITIS INVOLVING MULTIPLE JOINTS: ICD-10-CM

## 2024-08-20 DIAGNOSIS — E66.01 MORBID OBESITY: ICD-10-CM

## 2024-08-20 DIAGNOSIS — E03.8 HYPOTHYROIDISM DUE TO HASHIMOTO'S THYROIDITIS: ICD-10-CM

## 2024-08-20 DIAGNOSIS — Z96.643 PRESENCE OF ARTIFICIAL HIP JOINT, BILATERAL: ICD-10-CM

## 2024-08-20 DIAGNOSIS — I10 ESSENTIAL HYPERTENSION: ICD-10-CM

## 2024-08-20 PROCEDURE — 3079F DIAST BP 80-89 MM HG: CPT | Mod: CPTII,S$GLB,, | Performed by: FAMILY MEDICINE

## 2024-08-20 PROCEDURE — 99214 OFFICE O/P EST MOD 30 MIN: CPT | Mod: S$GLB,,, | Performed by: FAMILY MEDICINE

## 2024-08-20 PROCEDURE — 1157F ADVNC CARE PLAN IN RCRD: CPT | Mod: CPTII,S$GLB,, | Performed by: FAMILY MEDICINE

## 2024-08-20 PROCEDURE — 3288F FALL RISK ASSESSMENT DOCD: CPT | Mod: CPTII,S$GLB,, | Performed by: FAMILY MEDICINE

## 2024-08-20 PROCEDURE — 3066F NEPHROPATHY DOC TX: CPT | Mod: CPTII,S$GLB,, | Performed by: FAMILY MEDICINE

## 2024-08-20 PROCEDURE — 1101F PT FALLS ASSESS-DOCD LE1/YR: CPT | Mod: CPTII,S$GLB,, | Performed by: FAMILY MEDICINE

## 2024-08-20 PROCEDURE — 1159F MED LIST DOCD IN RCRD: CPT | Mod: CPTII,S$GLB,, | Performed by: FAMILY MEDICINE

## 2024-08-20 PROCEDURE — 3061F NEG MICROALBUMINURIA REV: CPT | Mod: CPTII,S$GLB,, | Performed by: FAMILY MEDICINE

## 2024-08-20 PROCEDURE — 3008F BODY MASS INDEX DOCD: CPT | Mod: CPTII,S$GLB,, | Performed by: FAMILY MEDICINE

## 2024-08-20 PROCEDURE — 3075F SYST BP GE 130 - 139MM HG: CPT | Mod: CPTII,S$GLB,, | Performed by: FAMILY MEDICINE

## 2024-08-20 NOTE — TELEPHONE ENCOUNTER
----- Message from Elidia Robledo sent at 8/20/2024 11:40 AM CDT -----  Call [phone] for a three month F/U Dr. Zhang

## 2024-08-21 NOTE — PROGRESS NOTES
SUBJECTIVE:    Patient ID: Ligia Krishnan is a 74 y.o. female.    Chief Complaint: Hyperlipidemia (Went over meds verbally, review Lab-results, Dexa ordered, abc )    74-year-old female complaining of severe pain in her shoulders bilaterally.  She has trouble raising her arms up to comb her hair.  Has trouble sleeping on his shoulders at night.  Has not had a workup or x-ray yet.    Osteoarthritis-has had bilateral hip replacements, knees are also achy intermittently.  Currently managing it with Arthritis Strength Tylenol, pain patches and Biofreeze.    She has a trip planned with her  to go to [place] in 5 weeks.    Hypothyroidism-managed with endocrine    2023-colonoscopy with Dr. Barone-[facility] 5 years    June of 2024-mammogram normal, due for DEXA scan this year    Hyperlipidemia  Pertinent negatives include no chest pain.       No visits with results within 6 Month(s) from this visit.   Latest known visit with results is:   Office Visit on 02/20/2024   Component Date Value Ref Range Status    Glucose 08/09/2024 109 (H)  65 - 99 mg/dL Final    BUN 08/09/2024 23  7 - 25 mg/dL Final    Creatinine 08/09/2024 1.06 (H)  0.60 - 1.00 mg/dL Final    eGFR 08/09/2024 56 (L)  > OR = 60 mL/min/1.73m2 Final    BUN/Creatinine Ratio 08/09/2024 22  6 - 22 (calc) Final    Sodium 08/09/2024 140  135 - 146 mmol/L Final    Potassium 08/09/2024 4.1  3.5 - 5.3 mmol/L Final    Chloride 08/09/2024 100  98 - 110 mmol/L Final    CO2 08/09/2024 34 (H)  20 - 32 mmol/L Final    Calcium 08/09/2024 10.5 (H)  8.6 - 10.4 mg/dL Final    Total Protein 08/09/2024 7.1  6.1 - 8.1 g/dL Final    Albumin 08/09/2024 4.4  3.6 - 5.1 g/dL Final    Globulin, Total 08/09/2024 2.7  1.9 - 3.7 g/dL (calc) Final    Albumin/Globulin Ratio 08/09/2024 1.6  1.0 - 2.5 (calc) Final    Total Bilirubin 08/09/2024 0.5  0.2 - 1.2 mg/dL Final    Alkaline Phosphatase 08/09/2024 67  37 - 153 U/L Final    AST 08/09/2024 19  10 - 35 U/L Final    ALT 08/09/2024  18  6 - 29 U/L Final    Cholesterol 08/09/2024 221 (H)  <200 mg/dL Final    HDL 08/09/2024 73  > OR = 50 mg/dL Final    Triglycerides 08/09/2024 183 (H)  <150 mg/dL Final    LDL Cholesterol 08/09/2024 118 (H)  mg/dL (calc) Final    HDL/Cholesterol Ratio 08/09/2024 3.0  <5.0 (calc) Final    Non HDL Chol. (LDL+VLDL) 08/09/2024 148 (H)  <130 mg/dL (calc) Final       Past Medical History:   Diagnosis Date    Chronic renal insufficiency, stage III (moderate) 05/16/2017    Esophageal reflux     Hyperlipidemia     Hypertension     OA (osteoarthritis) of hip     bilateral    Personal history of colonic polyps 07/28/2023    Colonoscopy Kobi Gonzalez MD     Social History     Socioeconomic History    Marital status:    Tobacco Use    Smoking status: Never    Smokeless tobacco: Never   Substance and Sexual Activity    Alcohol use: Yes     Alcohol/week: 1.0 standard drink of alcohol     Types: 1 Glasses of wine per week     Comment: 1 per month    Drug use: Never    Sexual activity: Not Currently     Partners: Male     Birth control/protection: Partner-Vasectomy, Post-menopausal     Social Determinants of Health     Financial Resource Strain: Low Risk  (2/13/2024)    Overall Financial Resource Strain (CARDIA)     Difficulty of Paying Living Expenses: Not hard at all   Food Insecurity: No Food Insecurity (2/13/2024)    Hunger Vital Sign     Worried About Running Out of Food in the Last Year: Never true     Ran Out of Food in the Last Year: Never true   Transportation Needs: No Transportation Needs (2/13/2024)    PRAPARE - Transportation     Lack of Transportation (Medical): No     Lack of Transportation (Non-Medical): No   Physical Activity: Inactive (2/13/2024)    Exercise Vital Sign     Days of Exercise per Week: 0 days     Minutes of Exercise per Session: 0 min   Stress: No Stress Concern Present (2/13/2024)    [employer] [place] [employer] - Occupational Stress Questionnaire     Feeling of Stress : Not  at all   Housing Stability: Low Risk  (2024)    Housing Stability Vital Sign     Unable to Pay for Housing in the Last Year: No     Number of Places Lived in the Last Year: 1     Unstable Housing in the Last Year: No     Past Surgical History:   Procedure Laterality Date    APPENDECTOMY      BREAST BIOPSY      BREAST CYST ASPIRATION      CATARACT EXTRACTION Left 10/2018    COLONOSCOPY  2016    Dr. -RTC 7 years    EYE SURGERY      Lasix; laser tear in retina; macular pucker repair; cataract    JOINT REPLACEMENT Right 13    (R) DAPHNEY    JOINT REPLACEMENT Left 10/18/12    (L) DAPHNEY     Family History   Problem Relation Name Age of Onset    Arthritis Mother [place] Tavares             Heart disease Mother [place] Hernandezer         Hypertension, arrythmia, congestive heart failure    Hypertension Mother [place] Tavares     Kidney disease Mother [place] Tavares         Low function    Alcohol abuse Father Laim Ureña             Cancer Father Liam Ureña         Heavy smoker, squamous cell nasal    COPD Father Liam Ureña         Was on oxygen    Heart disease Father Liam Ureña         Heart attach at 54    Breast cancer Sister Honey A [place]     Heart disease Sister Honey A [place]         Broken heart syndrome    Depression Daughter Eulalia Ureña Eulogio Beth         Dx with PCOS    Hearing loss Daughter Eulalia Ureña Eulogio Beth         Left ear    Alcohol abuse Maternal Grandmother Marilu Fajardo             Arthritis Maternal Grandmother Marilu A [place]     COPD Maternal Grandmother Marilu A [place]         Smoker    Learning disabilities Son Don Krishnan         ADHD    Melanoma Neg Hx      Lupus Neg Hx      Eczema Neg Hx      Psoriasis Neg Hx         The 10-year CVD risk score (Beni, et al., 2008) is: 13.4%    Values used to calculate the score:      Age: 74 years      Sex: Female      Diabetic: No      Tobacco smoker: No       Systolic Blood Pressure: 132 mmHg      Is BP treated: Yes      HDL Cholesterol: 73 mg/dL      Total Cholesterol: 221 mg/dL    All of your core healthy metrics are met.      Review of patient's allergies indicates:   Allergen Reactions    Morphine Nausea And Vomiting       Current Outpatient Medications:     acetaminophen (TYLENOL) 650 MG TbSR, , Disp: , Rfl:     APPLE CIDER VINEGAR ORAL, Take by mouth., Disp: , Rfl:     b complex vitamins tablet, Take 1 tablet by mouth once daily., Disp: , Rfl:     biotin 10,000 mcg Cap, Take 1 capsule by mouth once daily. , Disp: , Rfl:     esomeprazole (NEXIUM) 20 MG capsule, Take 1 capsule (20 mg total) by mouth before breakfast., Disp: 90 capsule, Rfl: 1    estradiol (ESTRACE) 0.5 MG tablet, Take 0.25 mg by mouth once daily. , Disp: , Rfl: 0    hydroCHLOROthiazide (HYDRODIURIL) 25 MG tablet, Take 1 tablet (25 mg total) by mouth once daily., Disp: 90 tablet, Rfl: 3    LACTOBACILLUS ACIDOPHILUS (PROBIOTIC ORAL), Take by mouth., Disp: , Rfl:     melatonin 10 mg Tab, Take 10 mg by mouth once daily., Disp: , Rfl:     metoprolol succinate (TOPROL-XL) 25 MG 24 hr tablet, Take 1 tablet (25 mg total) by mouth 2 (two) times daily., Disp: 180 tablet, Rfl: 3    multivitamin capsule, Take 1 capsule by mouth daily as needed. , Disp: , Rfl:     oxybutynin (DITROPAN-XL) 10 MG 24 hr tablet, Take 10 mg by mouth once daily., Disp: , Rfl:     pravastatin (PRAVACHOL) 40 MG tablet, Take 1 tablet (40 mg total) by mouth once daily., Disp: 90 tablet, Rfl: 3    progesterone (PROMETRIUM) 200 MG capsule, Take 100 mg by mouth every evening., Disp: , Rfl:     selenium 200 mcg Cap, Take by mouth., Disp: , Rfl:     SYNTHROID 25 mcg tablet, , Disp: , Rfl:     amLODIPine (NORVASC) 5 MG tablet, Take 1 tablet (5 mg total) by mouth once daily., Disp: 90 tablet, Rfl: 3    Review of Systems   Constitutional:  Positive for unexpected weight change. Negative for activity change.   HENT:  Negative for hearing loss,  "rhinorrhea and trouble swallowing.    Eyes:  Negative for discharge and visual disturbance.   Respiratory:  Negative for chest tightness and wheezing.    Cardiovascular:  Negative for chest pain and palpitations.   Gastrointestinal:  Negative for blood in stool, constipation, diarrhea and vomiting.   Endocrine: Positive for polydipsia. Negative for polyuria.   Genitourinary:  Negative for difficulty urinating, dysuria, hematuria and menstrual problem.   Musculoskeletal:  Positive for arthralgias and neck pain. Negative for joint swelling.   Neurological:  Positive for weakness. Negative for headaches.   Psychiatric/Behavioral:  Negative for confusion and dysphoric mood.            Objective:      Vitals:    08/20/24 1041   BP: 132/88   Pulse: 80   Weight: 112.9 kg (249 lb)   Height: 5' 5" (1.651 m)     Physical Exam  Vitals and nursing note reviewed.   Constitutional:       General: She is not in acute distress.     Appearance: She is well-developed. She is obese. She is not toxic-appearing.   HENT:      Head: Normocephalic and atraumatic.      Right Ear: Tympanic membrane and external ear normal.      Left Ear: Tympanic membrane and external ear normal.      Nose: Nose normal.      Mouth/Throat:      Pharynx: Oropharynx is clear.   Eyes:      Pupils: Pupils are equal, round, and reactive to light.   Neck:      Thyroid: No thyromegaly.      Vascular: No carotid bruit.   Cardiovascular:      Rate and Rhythm: Normal rate and regular rhythm.      Heart sounds: Normal heart sounds. No murmur heard.  Pulmonary:      Effort: Pulmonary effort is normal.      Breath sounds: Normal breath sounds. No wheezing or rales.   Abdominal:      General: Bowel sounds are normal. There is no distension.      Palpations: Abdomen is soft.      Tenderness: There is no abdominal tenderness.   Musculoskeletal:         General: No tenderness or deformity. Normal range of motion.      Cervical back: Normal range of motion and neck supple.     "  Lumbar back: Normal. No spasms.      Comments: Bends 90 degrees at  waist, right shoulder limited to 150 degree flexion, left shoulder limited to 120 degree flexion, decreased internal rotation bilaterally right knee greater than left knee crepitance, no pitting edema to lower extremities   Lymphadenopathy:      Cervical: No cervical adenopathy.   Skin:     General: Skin is warm and dry.      Findings: No rash.   Neurological:      Mental Status: She is alert and oriented to person, place, and time. Mental status is at baseline.      Cranial Nerves: No cranial nerve deficit.      Coordination: Coordination normal.      Gait: Gait normal.   Psychiatric:         Mood and Affect: Mood normal.         Behavior: Behavior normal.         Thought Content: Thought content normal.         Judgment: Judgment normal.           Assessment:       1. Rotator cuff impingement syndrome, unspecified laterality    2. Menopause    3. Mixed hyperlipidemia    4. Essential hypertension    5. Urge urinary incontinence    6. Morbid obesity    7. Hypothyroidism due to Hashimoto's thyroiditis    8. Autoimmune thyroiditis    9. Gastro-esophageal reflux disease without esophagitis    10. Rotator cuff impingement syndrome of right shoulder    11. Primary osteoarthritis involving multiple joints    12. Presence of artificial hip joint, bilateral    13. Primary osteoarthritis of both knees         Plan:       Rotator cuff impingement syndrome, unspecified laterality  -     X-ray Shoulder 2 or More Views Right; Future; Expected date: 08/20/2024  -     X-Ray Shoulder 2 or More Views Left; Future  -     Ambulatory referral/consult to Physical/Occupational Therapy; Future; Expected date: 08/27/2024  X-ray both shoulder, refer to physical therapy for restoration of range of motion  Menopause  -     DXA Bone Density Axial Skeleton 1 or more sites; Future; Expected date: 10/15/2024  DEXA scan order  Mixed hyperlipidemia  Cholesterol 221 mild elevation  HDL 73   mild elevation continue current medications cut back on fried foods and fatty foods  Essential hypertension  Blood pressure well controlled currently  Urge urinary incontinence    Morbid obesity  Weight loss recommend  Hypothyroidism due to Hashimoto's thyroiditis  Continue Synthroid 25 mcg daily  Autoimmune thyroiditis    Gastro-esophageal reflux disease without esophagitis  Controlled well with medication  Rotator cuff impingement syndrome of right shoulder  Refer to physical therapy for treatment  Primary osteoarthritis involving multiple joints    Presence of artificial hip joint, bilateral    Primary osteoarthritis of both knees  Continue Tylenol Arthritis Strength    Follow up in about 3 months (around 11/20/2024), or OA , htn.        8/20/2024 Hemant Zhang       LEFT HIP REPLACEMENT

## 2024-08-22 ENCOUNTER — HOSPITAL ENCOUNTER (OUTPATIENT)
Dept: RADIOLOGY | Facility: HOSPITAL | Age: 74
Discharge: HOME | End: 2024-08-22
Attending: FAMILY MEDICINE
Payer: MEDICARE

## 2024-08-22 DIAGNOSIS — M75.40 ROTATOR CUFF IMPINGEMENT SYNDROME, UNSPECIFIED LATERALITY: ICD-10-CM

## 2024-08-22 PROCEDURE — 73030 X-RAY EXAM OF SHOULDER: CPT | Mod: 26,50,, | Performed by: RADIOLOGY

## 2024-08-22 PROCEDURE — 73030 X-RAY EXAM OF SHOULDER: CPT | Mod: TC,50,PO

## 2024-09-01 NOTE — PROGRESS NOTES
Call patient.  There bone spurs in both of your shoulders.  Compatible with osteoarthritis.  Right acromioclavicular joint arthritis looks worse in the left acromial clavicular shoulder arthritis.  If this gets worse we can refer to orthopedics

## 2024-09-04 ENCOUNTER — TELEPHONE (OUTPATIENT)
Dept: FAMILY MEDICINE | Facility: CLINIC | Age: 74
End: 2024-09-04
Payer: MEDICARE

## 2024-09-04 NOTE — TELEPHONE ENCOUNTER
----- Message from Hemant Zhang MD sent at 9/1/2024  5:12 PM CDT -----  Call patient.  There bone spurs in both of your shoulders.  Compatible with osteoarthritis.  Right acromioclavicular joint arthritis looks worse in the left acromial clavicular shoulder arthritis.  If this gets worse we can refer to orthopedics

## 2024-09-04 NOTE — TELEPHONE ENCOUNTER
Spoke with pt in regards to recent xray results. Verbalized verbatim per Dr. Zhang. Pt acknowledged understanding. Pt would like to continue with PT for the moment.

## 2024-10-08 ENCOUNTER — HOSPITAL ENCOUNTER (OUTPATIENT)
Dept: RADIOLOGY | Facility: HOSPITAL | Age: 74
Discharge: HOME | End: 2024-10-08
Attending: FAMILY MEDICINE
Payer: MEDICARE

## 2024-10-08 DIAGNOSIS — Z78.0 MENOPAUSE: ICD-10-CM

## 2024-10-08 DIAGNOSIS — Z78.0 POSTMENOPAUSE: ICD-10-CM

## 2024-10-08 PROCEDURE — 77081 DXA BONE DENSITY APPENDICULR: CPT | Mod: TC,PO

## 2024-10-08 PROCEDURE — 77080 DXA BONE DENSITY AXIAL: CPT | Mod: 26,,, | Performed by: RADIOLOGY

## 2024-10-08 PROCEDURE — 77080 DXA BONE DENSITY AXIAL: CPT | Mod: TC,PO

## 2024-10-09 NOTE — PROGRESS NOTES
Call patient.  DEXA scan shows normal bone density and hips and spine.  There has been a 7% increase in bone density since last DEXA scan.  Continue estradiol and calcium plus D vitamins

## 2024-10-09 NOTE — PROGRESS NOTES
Call patient.  DEXA scan shows normal bone density and hips and spine.  There has been a 7% increase in bone density since prior DEXA.  Continue calcium plus D vitamins

## 2024-10-10 ENCOUNTER — TELEPHONE (OUTPATIENT)
Dept: FAMILY MEDICINE | Facility: CLINIC | Age: 74
End: 2024-10-10
Payer: MEDICARE

## 2024-10-10 NOTE — TELEPHONE ENCOUNTER
----- Message from Hemant Zhang MD sent at 10/9/2024  4:24 PM CDT -----  Call patient.  DEXA scan shows normal bone density and hips and spine.  There has been a 7% increase in bone density since prior DEXA.  Continue calcium plus D vitamins

## 2024-10-10 NOTE — TELEPHONE ENCOUNTER
Spoke with patient on bone density scan.     DEXA scan shows normal bone density and hips and spine.  There has been a 7% increase in bone density since prior DEXA.  Continue calcium plus D vitamins

## 2024-11-13 ENCOUNTER — PATIENT MESSAGE (OUTPATIENT)
Dept: FAMILY MEDICINE | Facility: CLINIC | Age: 74
End: 2024-11-13
Payer: MEDICARE

## 2024-11-13 DIAGNOSIS — I10 ESSENTIAL HYPERTENSION: ICD-10-CM

## 2024-11-13 DIAGNOSIS — E78.2 MIXED HYPERLIPIDEMIA: Primary | ICD-10-CM

## 2024-11-13 DIAGNOSIS — Z79.899 ENCOUNTER FOR LONG-TERM (CURRENT) USE OF MEDICATIONS: ICD-10-CM

## 2024-11-15 LAB
ALBUMIN SERPL-MCNC: 4.4 G/DL (ref 3.6–5.1)
ALBUMIN/GLOB SERPL: 1.6 (CALC) (ref 1–2.5)
ALP SERPL-CCNC: 79 U/L (ref 37–153)
ALT SERPL-CCNC: 16 U/L (ref 6–29)
AST SERPL-CCNC: 16 U/L (ref 10–35)
BILIRUB SERPL-MCNC: 0.5 MG/DL (ref 0.2–1.2)
BUN SERPL-MCNC: 21 MG/DL (ref 7–25)
BUN/CREAT SERPL: 21 (CALC) (ref 6–22)
CALCIUM SERPL-MCNC: 10.5 MG/DL (ref 8.6–10.4)
CHLORIDE SERPL-SCNC: 99 MMOL/L (ref 98–110)
CHOLEST SERPL-MCNC: 229 MG/DL
CHOLEST/HDLC SERPL: 3.2 (CALC)
CO2 SERPL-SCNC: 31 MMOL/L (ref 20–32)
CREAT SERPL-MCNC: 1.02 MG/DL (ref 0.6–1)
EGFR: 58 ML/MIN/1.73M2
GLOBULIN SER CALC-MCNC: 2.7 G/DL (CALC) (ref 1.9–3.7)
GLUCOSE SERPL-MCNC: 105 MG/DL (ref 65–99)
HDLC SERPL-MCNC: 72 MG/DL
LDLC SERPL CALC-MCNC: 127 MG/DL (CALC)
NONHDLC SERPL-MCNC: 157 MG/DL (CALC)
POTASSIUM SERPL-SCNC: 4 MMOL/L (ref 3.5–5.3)
PROT SERPL-MCNC: 7.1 G/DL (ref 6.1–8.1)
SODIUM SERPL-SCNC: 140 MMOL/L (ref 135–146)
TRIGL SERPL-MCNC: 187 MG/DL

## 2024-11-21 ENCOUNTER — OFFICE VISIT (OUTPATIENT)
Dept: FAMILY MEDICINE | Facility: CLINIC | Age: 74
End: 2024-11-21
Payer: MEDICARE

## 2024-11-21 VITALS
SYSTOLIC BLOOD PRESSURE: 138 MMHG | HEIGHT: 65 IN | OXYGEN SATURATION: 96 % | WEIGHT: 238 LBS | BODY MASS INDEX: 39.65 KG/M2 | DIASTOLIC BLOOD PRESSURE: 86 MMHG | HEART RATE: 90 BPM

## 2024-11-21 DIAGNOSIS — E66.01 MORBID OBESITY: ICD-10-CM

## 2024-11-21 DIAGNOSIS — E06.3 HYPOTHYROIDISM DUE TO HASHIMOTO THYROIDITIS: ICD-10-CM

## 2024-11-21 DIAGNOSIS — E06.3 AUTOIMMUNE THYROIDITIS: ICD-10-CM

## 2024-11-21 DIAGNOSIS — Z96.643 PRESENCE OF ARTIFICIAL HIP JOINT, BILATERAL: ICD-10-CM

## 2024-11-21 DIAGNOSIS — M75.41 ROTATOR CUFF IMPINGEMENT SYNDROME OF RIGHT SHOULDER: ICD-10-CM

## 2024-11-21 DIAGNOSIS — M25.562 CHRONIC PAIN OF LEFT KNEE: Primary | ICD-10-CM

## 2024-11-21 DIAGNOSIS — I10 ESSENTIAL HYPERTENSION: ICD-10-CM

## 2024-11-21 DIAGNOSIS — M17.0 PRIMARY OSTEOARTHRITIS OF BOTH KNEES: ICD-10-CM

## 2024-11-21 DIAGNOSIS — M15.0 PRIMARY OSTEOARTHRITIS INVOLVING MULTIPLE JOINTS: ICD-10-CM

## 2024-11-21 DIAGNOSIS — E78.2 MIXED HYPERLIPIDEMIA: ICD-10-CM

## 2024-11-21 DIAGNOSIS — G89.29 CHRONIC PAIN OF LEFT KNEE: Primary | ICD-10-CM

## 2024-11-21 DIAGNOSIS — K21.9 GASTRO-ESOPHAGEAL REFLUX DISEASE WITHOUT ESOPHAGITIS: ICD-10-CM

## 2024-11-21 DIAGNOSIS — N39.41 URGE URINARY INCONTINENCE: ICD-10-CM

## 2024-11-21 PROCEDURE — 1125F AMNT PAIN NOTED PAIN PRSNT: CPT | Mod: CPTII,S$GLB,, | Performed by: FAMILY MEDICINE

## 2024-11-21 PROCEDURE — 3008F BODY MASS INDEX DOCD: CPT | Mod: CPTII,S$GLB,, | Performed by: FAMILY MEDICINE

## 2024-11-21 PROCEDURE — 3288F FALL RISK ASSESSMENT DOCD: CPT | Mod: CPTII,S$GLB,, | Performed by: FAMILY MEDICINE

## 2024-11-21 PROCEDURE — 99214 OFFICE O/P EST MOD 30 MIN: CPT | Mod: S$GLB,,, | Performed by: FAMILY MEDICINE

## 2024-11-21 PROCEDURE — 1101F PT FALLS ASSESS-DOCD LE1/YR: CPT | Mod: CPTII,S$GLB,, | Performed by: FAMILY MEDICINE

## 2024-11-21 PROCEDURE — 1159F MED LIST DOCD IN RCRD: CPT | Mod: CPTII,S$GLB,, | Performed by: FAMILY MEDICINE

## 2024-11-21 PROCEDURE — 3075F SYST BP GE 130 - 139MM HG: CPT | Mod: CPTII,S$GLB,, | Performed by: FAMILY MEDICINE

## 2024-11-21 PROCEDURE — 1157F ADVNC CARE PLAN IN RCRD: CPT | Mod: CPTII,S$GLB,, | Performed by: FAMILY MEDICINE

## 2024-11-21 PROCEDURE — 3079F DIAST BP 80-89 MM HG: CPT | Mod: CPTII,S$GLB,, | Performed by: FAMILY MEDICINE

## 2024-11-21 PROCEDURE — 3066F NEPHROPATHY DOC TX: CPT | Mod: CPTII,S$GLB,, | Performed by: FAMILY MEDICINE

## 2024-11-21 PROCEDURE — 3061F NEG MICROALBUMINURIA REV: CPT | Mod: CPTII,S$GLB,, | Performed by: FAMILY MEDICINE

## 2024-11-21 RX ORDER — ROSUVASTATIN CALCIUM 20 MG/1
20 TABLET, COATED ORAL DAILY
Qty: 90 TABLET | Refills: 3 | Status: SHIPPED | OUTPATIENT
Start: 2024-11-21 | End: 2025-11-21

## 2024-11-21 RX ORDER — CHOLECALCIFEROL (VITAMIN D3) 25 MCG
4000 TABLET ORAL DAILY
COMMUNITY

## 2024-11-21 RX ORDER — AMOXICILLIN 500 MG
2 CAPSULE ORAL DAILY
COMMUNITY

## 2024-11-24 NOTE — PROGRESS NOTES
SUBJECTIVE:    Patient ID: Ligia Krishnan is a 74 y.o. female.    Chief Complaint: Hyperlipidemia (Brought Lab-results, review Labs, left knee pain, discuss about RSV,abc )    74-year-old female who states that her shoulders have greatly improved with physical therapy.  She is now using resistance bands and doing pull-ups and dry needling during [facility] physical therapy.    Her left knee is having increasing pains.  She is curious about getting an x-ray and doing some therapy on the knee.  She went on a trip to [place] and [place] and had difficulty with stairs and climbing into buses.    She has had a dry cough and postnasal drip since September.    Endocrine Dr. Hyde-Hashimoto's thyroiditis being followed.    October 20, 2024 DEXA scan showed normal bone density with a 7.1% increase in density.    2023-colonoscopy with Dr. [facility] 5 years    June 20, 2024-mammogram normal    Hyperlipidemia currently on pravastatin.  Cholesterol remains elevated 229 .    Hyperlipidemia  Pertinent negatives include no chest pain, myalgias or shortness of breath.       Patient Message on 11/13/2024   Component Date Value Ref Range Status    Glucose 11/14/2024 105 (H)  65 - 99 mg/dL Final    BUN 11/14/2024 21  7 - 25 mg/dL Final    Creatinine 11/14/2024 1.02 (H)  0.60 - 1.00 mg/dL Final    eGFR 11/14/2024 58 (L)  > OR = 60 mL/min/1.73m2 Final    BUN/Creatinine Ratio 11/14/2024 21  6 - 22 (calc) Final    Sodium 11/14/2024 140  135 - 146 mmol/L Final    Potassium 11/14/2024 4.0  3.5 - 5.3 mmol/L Final    Chloride 11/14/2024 99  98 - 110 mmol/L Final    CO2 11/14/2024 31  20 - 32 mmol/L Final    Calcium 11/14/2024 10.5 (H)  8.6 - 10.4 mg/dL Final    Total Protein 11/14/2024 7.1  6.1 - 8.1 g/dL Final    Albumin 11/14/2024 4.4  3.6 - 5.1 g/dL Final    Globulin, Total 11/14/2024 2.7  1.9 - 3.7 g/dL (calc) Final    Albumin/Globulin Ratio 11/14/2024 1.6  1.0 - 2.5 (calc) Final    Total Bilirubin 11/14/2024 0.5   0.2 - 1.2 mg/dL Final    Alkaline Phosphatase 11/14/2024 79  37 - 153 U/L Final    AST 11/14/2024 16  10 - 35 U/L Final    ALT 11/14/2024 16  6 - 29 U/L Final    Cholesterol 11/14/2024 229 (H)  <200 mg/dL Final    HDL 11/14/2024 72  > OR = 50 mg/dL Final    Triglycerides 11/14/2024 187 (H)  <150 mg/dL Final    LDL Cholesterol 11/14/2024 127 (H)  mg/dL (calc) Final    HDL/Cholesterol Ratio 11/14/2024 3.2  <5.0 (calc) Final    Non HDL Chol. (LDL+VLDL) 11/14/2024 157 (H)  <130 mg/dL (calc) Final       Past Medical History:   Diagnosis Date    Chronic renal insufficiency, stage III (moderate) 05/16/2017    Esophageal reflux     Hyperlipidemia     Hypertension     OA (osteoarthritis) of hip     bilateral    Personal history of colonic polyps 07/28/2023    Colonoscopy Kobi Gonzalez MD     Social History     Socioeconomic History    Marital status:    Tobacco Use    Smoking status: Never    Smokeless tobacco: Never   Substance and Sexual Activity    Alcohol use: Yes     Alcohol/week: 1.0 standard drink of alcohol     Types: 1 Glasses of wine per week     Comment: 1 per month    Drug use: Never    Sexual activity: Not Currently     Partners: Male     Birth control/protection: Partner-Vasectomy, Post-menopausal     Social Drivers of Health     Financial Resource Strain: Low Risk  (2/13/2024)    Overall Financial Resource Strain (CARDIA)     Difficulty of Paying Living Expenses: Not hard at all   Food Insecurity: No Food Insecurity (2/13/2024)    Hunger Vital Sign     Worried About Running Out of Food in the Last Year: Never true     Ran Out of Food in the Last Year: Never true   Transportation Needs: No Transportation Needs (2/13/2024)    PRAPARE - Transportation     Lack of Transportation (Medical): No     Lack of Transportation (Non-Medical): No   Physical Activity: Inactive (2/13/2024)    Exercise Vital Sign     Days of Exercise per Week: 0 days     Minutes of Exercise per Session: 0 min   Stress: No Stress  Concern Present (2024)    [employer] - Occupational Stress Questionnaire     Feeling of Stress : Not at all   Housing Stability: Low Risk  (2024)    Housing Stability Vital Sign     Unable to Pay for Housing in the Last Year: No     Number of Places Lived in the Last Year: 1     Unstable Housing in the Last Year: No     Past Surgical History:   Procedure Laterality Date    APPENDECTOMY      BREAST BIOPSY      BREAST CYST ASPIRATION      CATARACT EXTRACTION Left 10/2018    COLONOSCOPY  2016    Dr. -RTC 7 years    EYE SURGERY      Lasix; laser tear in retina; macular pucker repair; cataract    JOINT REPLACEMENT Right 13    (R) DAPHNEY    JOINT REPLACEMENT Left 10/18/12    (L) DAPHNEY     Family History   Problem Relation Name Age of Onset    Arthritis Mother [place] Tavares             Heart disease Mother [place] Tavares         Hypertension, arrythmia, congestive heart failure    Hypertension Mother India Tavares     Kidney disease Mother [place] Tavares         Low function    Alcohol abuse Father Liam Ureña             Cancer Father Liam Ureña         Heavy smoker, squamous cell nasal    COPD Father Liam Ureña         Was on oxygen    Heart disease Father Liam Ureña         Heart attach at 54    Breast cancer Sister Honey A [place]     Heart disease Sister Honey A [place]         Broken heart syndrome    Depression Daughter Eulalia Ureña Eulogio Beth         Dx with PCOS    Hearing loss Daughter Eulalia Ureña Eulogio Beth         Left ear    Alcohol abuse Maternal Grandmother Marilu Fajardo             Arthritis Maternal Grandmother Marilu Fajardo     COPD Maternal Grandmother Marilu A Tana         Smoker    Learning disabilities Son Don RASHID Eulogio         ADHD    Melanoma Neg Hx      Lupus Neg Hx      Eczema Neg Hx      Psoriasis Neg Hx         The 10-year CVD risk score (JACKI'Agostleyla et al.,  2008) is: 15.8%    Values used to calculate the score:      Age: 74 years      Sex: Female      Diabetic: No      Tobacco smoker: No      Systolic Blood Pressure: 138 mmHg      Is BP treated: Yes      HDL Cholesterol: 72 mg/dL      Total Cholesterol: 229 mg/dL    All of your core healthy metrics are met.      Review of patient's allergies indicates:   Allergen Reactions    Morphine Nausea And Vomiting       Current Outpatient Medications:     acetaminophen (TYLENOL) 650 MG TbSR, , Disp: , Rfl:     amLODIPine (NORVASC) 5 MG tablet, Take 1 tablet (5 mg total) by mouth once daily., Disp: 90 tablet, Rfl: 3    APPLE CIDER VINEGAR ORAL, Take by mouth., Disp: , Rfl:     b complex vitamins tablet, Take 1 tablet by mouth once daily., Disp: , Rfl:     biotin 10,000 mcg Cap, Take 1 capsule by mouth once daily. , Disp: , Rfl:     esomeprazole (NEXIUM) 20 MG capsule, Take 1 capsule (20 mg total) by mouth before breakfast., Disp: 90 capsule, Rfl: 1    estradiol (ESTRACE) 0.5 MG tablet, Take 0.25 mg by mouth once daily. , Disp: , Rfl: 0    hydroCHLOROthiazide (HYDRODIURIL) 25 MG tablet, Take 1 tablet (25 mg total) by mouth once daily., Disp: 90 tablet, Rfl: 3    LACTOBACILLUS ACIDOPHILUS (PROBIOTIC ORAL), Take by mouth., Disp: , Rfl:     melatonin 10 mg Tab, Take 10 mg by mouth once daily., Disp: , Rfl:     metoprolol succinate (TOPROL-XL) 25 MG 24 hr tablet, Take 1 tablet (25 mg total) by mouth 2 (two) times daily., Disp: 180 tablet, Rfl: 3    multivitamin capsule, Take 1 capsule by mouth daily as needed. , Disp: , Rfl:     omega-3 fatty acids/fish oil (FISH OIL-OMEGA-3 FATTY ACIDS) 300-1,000 mg capsule, Take 2 capsules by mouth once daily. 3 capsule, Disp: , Rfl:     oxybutynin (DITROPAN-XL) 10 MG 24 hr tablet, Take 10 mg by mouth once daily., Disp: , Rfl:     pravastatin (PRAVACHOL) 40 MG tablet, Take 1 tablet (40 mg total) by mouth once daily., Disp: 90 tablet, Rfl: 3    progesterone (PROMETRIUM) 200 MG capsule, Take 100 mg by  "mouth every evening., Disp: , Rfl:     selenium 200 mcg Cap, Take by mouth., Disp: , Rfl:     SYNTHROID 25 mcg tablet, , Disp: , Rfl:     vitamin D (VITAMIN D3) 1000 units Tab, Take 4,000 Units by mouth once daily., Disp: , Rfl:     rosuvastatin (CRESTOR) 20 MG tablet, Take 1 tablet (20 mg total) by mouth once daily., Disp: 90 tablet, Rfl: 3    Review of Systems   Constitutional:  Positive for activity change. Negative for appetite change, chills, fatigue, fever and unexpected weight change.   HENT:  Negative for ear discharge, ear pain, hearing loss, rhinorrhea and trouble swallowing.    Eyes:  Negative for pain, discharge and visual disturbance.   Respiratory:  Negative for apnea, cough, chest tightness, shortness of breath and wheezing.    Cardiovascular:  Negative for chest pain, palpitations and leg swelling.   Gastrointestinal:  Negative for abdominal pain, blood in stool, constipation, diarrhea, nausea, vomiting and reflux.   Endocrine: Negative for cold intolerance, heat intolerance, polydipsia and polyuria.   Genitourinary:  Negative for bladder incontinence, difficulty urinating, dysuria, hematuria, menstrual problem, nocturia and urgency.   Musculoskeletal:  Positive for arthralgias and joint swelling. Negative for gait problem, myalgias and neck pain.   Neurological:  Negative for dizziness, seizures, weakness, numbness and headaches.   Psychiatric/Behavioral:  Negative for behavioral problems, confusion, dysphoric mood and hallucinations. The patient is not nervous/anxious.            Objective:      Vitals:    11/21/24 1145   BP: 138/86   Pulse: 90   SpO2: 96%   Weight: 108 kg (238 lb)   Height: 5' 5" (1.651 m)     Physical Exam  Vitals and nursing note reviewed.   Constitutional:       General: She is not in acute distress.     Appearance: Normal appearance. She is well-developed. She is obese. She is not toxic-appearing.   HENT:      Head: Normocephalic and atraumatic.      Right Ear: Tympanic " membrane and external ear normal.      Left Ear: Tympanic membrane and external ear normal.      Nose: Nose normal.      Mouth/Throat:      Pharynx: Oropharynx is clear. No posterior oropharyngeal erythema.   Eyes:      Pupils: Pupils are equal, round, and reactive to light.   Neck:      Thyroid: No thyromegaly.      Vascular: No carotid bruit.   Cardiovascular:      Rate and Rhythm: Normal rate and regular rhythm.      Heart sounds: Normal heart sounds. No murmur heard.  Pulmonary:      Effort: Pulmonary effort is normal.      Breath sounds: Normal breath sounds. No wheezing or rales.   Abdominal:      General: Bowel sounds are normal. There is no distension.      Palpations: Abdomen is soft.      Tenderness: There is no abdominal tenderness.   Musculoskeletal:         General: No tenderness or deformity. Normal range of motion.      Cervical back: Normal range of motion and neck supple.      Lumbar back: Normal. No spasms.      Comments: Bends 90 degrees at  waist, shoulders and knees have full range of motion, no pitting edema to lower extremities, knees are very crepitant.   Lymphadenopathy:      Cervical: No cervical adenopathy.   Skin:     General: Skin is warm and dry.      Findings: No rash.   Neurological:      General: No focal deficit present.      Mental Status: She is alert and oriented to person, place, and time. Mental status is at baseline.      Cranial Nerves: No cranial nerve deficit.      Coordination: Coordination normal.      Gait: Gait abnormal.   Psychiatric:         Mood and Affect: Mood normal.         Behavior: Behavior normal.         Thought Content: Thought content normal.         Judgment: Judgment normal.           Assessment:       1. Chronic pain of left knee    2. Mixed hyperlipidemia    3. Essential hypertension    4. Urge urinary incontinence    5. Morbid obesity    6. Hypothyroidism due to Hashimoto thyroiditis    7. Autoimmune thyroiditis    8. Gastro-esophageal reflux disease  without esophagitis    9. Rotator cuff impingement syndrome of right shoulder    10. Primary osteoarthritis involving multiple joints    11. Presence of artificial hip joint, bilateral    12. Primary osteoarthritis of both knees         Plan:       Chronic pain of left knee  -     X-Ray Knee 3 View Left; Future; Expected date: 11/21/2024  -     Ambulatory referral/consult to Physical/Occupational Therapy; Future; Expected date: 11/28/2024  Will get x-rays of the left knee.  She would like a physical therapy referral to try some treatment.  Mixed hyperlipidemia  -     rosuvastatin (CRESTOR) 20 MG tablet; Take 1 tablet (20 mg total) by mouth once daily.  Dispense: 90 tablet; Refill: 3  Cholesterol 229, triglycerides 187 , change pravastatin to rosuvastatin 20 mg and recheck lipids again in six-months  Essential hypertension  Blood pressure well controlled  Urge urinary incontinence  Stable at this time  Morbid obesity  Unable to exercise to lose weight  Hypothyroidism due to Hashimoto thyroiditis  Continue endocrinology recommendation  Autoimmune thyroiditis    Gastro-esophageal reflux disease without esophagitis  Doing well on Nexium 20 mg daily  Rotator cuff impingement syndrome of right shoulder  Symptoms resolved with physical therapy  Primary osteoarthritis involving multiple joints    Presence of artificial hip joint, bilateral    Primary osteoarthritis of both knees  As above    Follow up in about 6 months (around 5/21/2025), or hld  OA knee.        11/23/2024 Hemant Zhang

## 2024-11-26 ENCOUNTER — HOSPITAL ENCOUNTER (OUTPATIENT)
Dept: RADIOLOGY | Facility: HOSPITAL | Age: 74
Discharge: HOME | End: 2024-11-26
Attending: FAMILY MEDICINE
Payer: MEDICARE

## 2024-11-26 DIAGNOSIS — M25.562 CHRONIC PAIN OF LEFT KNEE: ICD-10-CM

## 2024-11-26 DIAGNOSIS — G89.29 CHRONIC PAIN OF LEFT KNEE: ICD-10-CM

## 2024-11-26 PROCEDURE — 73562 X-RAY EXAM OF KNEE 3: CPT | Mod: TC,PO,LT

## 2024-11-26 PROCEDURE — 73562 X-RAY EXAM OF KNEE 3: CPT | Mod: 26,LT,, | Performed by: RADIOLOGY

## 2024-11-27 ENCOUNTER — TELEPHONE (OUTPATIENT)
Dept: FAMILY MEDICINE | Facility: CLINIC | Age: 74
End: 2024-11-27
Payer: MEDICARE

## 2024-11-27 NOTE — PROGRESS NOTES
Call patient.  X-rays of her left knee shows very mild arthritis of the left knee small bone spurs are seen.  She is not bone-on-bone.  Continue conservative treatment,

## 2024-11-27 NOTE — TELEPHONE ENCOUNTER
----- Message from Hemant Zhang MD sent at 11/27/2024  7:32 AM CST -----  Call patient.  X-rays of her left knee shows very mild arthritis of the left knee small bone spurs are seen.  She is not bone-on-bone.  Continue conservative treatment,

## 2025-02-13 ENCOUNTER — TELEPHONE (OUTPATIENT)
Dept: FAMILY MEDICINE | Facility: CLINIC | Age: 75
End: 2025-02-13
Payer: MEDICARE

## 2025-02-13 DIAGNOSIS — E06.3 HYPOTHYROIDISM DUE TO HASHIMOTO THYROIDITIS: ICD-10-CM

## 2025-02-13 DIAGNOSIS — Z79.899 ENCOUNTER FOR LONG-TERM (CURRENT) USE OF MEDICATIONS: Primary | ICD-10-CM

## 2025-02-13 DIAGNOSIS — I10 ESSENTIAL HYPERTENSION: ICD-10-CM

## 2025-02-13 DIAGNOSIS — E78.2 MIXED HYPERLIPIDEMIA: ICD-10-CM

## 2025-03-03 ENCOUNTER — OFFICE VISIT (OUTPATIENT)
Dept: FAMILY MEDICINE | Facility: CLINIC | Age: 75
End: 2025-03-03
Payer: MEDICARE

## 2025-03-03 VITALS
SYSTOLIC BLOOD PRESSURE: 132 MMHG | OXYGEN SATURATION: 98 % | BODY MASS INDEX: 39.65 KG/M2 | HEART RATE: 82 BPM | HEIGHT: 65 IN | DIASTOLIC BLOOD PRESSURE: 86 MMHG | WEIGHT: 238 LBS

## 2025-03-03 DIAGNOSIS — E06.3 AUTOIMMUNE THYROIDITIS: ICD-10-CM

## 2025-03-03 DIAGNOSIS — M17.0 PRIMARY OSTEOARTHRITIS OF BOTH KNEES: ICD-10-CM

## 2025-03-03 DIAGNOSIS — M75.41 ROTATOR CUFF IMPINGEMENT SYNDROME OF RIGHT SHOULDER: ICD-10-CM

## 2025-03-03 DIAGNOSIS — I10 ESSENTIAL HYPERTENSION: ICD-10-CM

## 2025-03-03 DIAGNOSIS — E06.3 HYPOTHYROIDISM DUE TO HASHIMOTO THYROIDITIS: ICD-10-CM

## 2025-03-03 DIAGNOSIS — M15.0 PRIMARY OSTEOARTHRITIS INVOLVING MULTIPLE JOINTS: Primary | ICD-10-CM

## 2025-03-03 DIAGNOSIS — E83.52 HYPERCALCEMIA: ICD-10-CM

## 2025-03-03 DIAGNOSIS — K21.9 GASTRO-ESOPHAGEAL REFLUX DISEASE WITHOUT ESOPHAGITIS: ICD-10-CM

## 2025-03-03 DIAGNOSIS — Z78.0 MENOPAUSE: ICD-10-CM

## 2025-03-03 DIAGNOSIS — E66.01 MORBID OBESITY: ICD-10-CM

## 2025-03-03 DIAGNOSIS — N39.41 URGE URINARY INCONTINENCE: ICD-10-CM

## 2025-03-03 DIAGNOSIS — Z96.643 PRESENCE OF ARTIFICIAL HIP JOINT, BILATERAL: ICD-10-CM

## 2025-03-03 DIAGNOSIS — E78.2 MIXED HYPERLIPIDEMIA: ICD-10-CM

## 2025-03-03 NOTE — PROGRESS NOTES
SUBJECTIVE:    Patient ID: Ligia Krishnan is a 74 y.o. female.    Chief Complaint: Knee Pain (Bilateral knees//legs pain, review Lab-results, no bottles, dizziness last week, abc )    Knee Pain         History of Present Illness    CHIEF COMPLAINT:  Ligia presents today for follow up.    MUSCULOSKELETAL:  She reports chronic lower back pain that is constant without current sciatic radiation. This stems from a teenage cheerleading injury while supporting weight at the base of a pyramid formation, resulting in arthritis. She has bilateral hip replacements (right in October 2012, left in June 2013) due to previous bone-on-bone condition with significant joint surface deterioration. She is currently in physical therapy for knee pain and has difficulty with stairs, requiring support from handrail or assistance. X-ray shows presence of cartilage in knee, confirming she is not bone-on-bone. She uses Tylenol regularly for pain management after discontinuing Aleve, and BioFreeze when traveling. Her shoulder condition has improved 90%, with better range of motion despite some arthritis. She reports mild difficulty reaching top shelves, which she attributes to her height rather than shoulder issues.    LABS:  Bone density is normal with no evidence of osteoporosis. Mammogram in June was normal. CBC shows no anemia. Thyroid function and urinalysis are normal. Cholesterol is elevated at 229.    MEDICATIONS:  She switched from privastatin to generic Crestor 4 months ago.      ROS:  Constitutional: -appetite change, -chills, -fatigue, -fever, -unexpected weight change  HENT: -ear pain, -trouble swallowing  Eyes: -pain, -discharge, -visual disturbance  Respiratory: -apnea, -cough, -shortness of breath, -wheezing  Cardiovascular: -chest pain, -leg swelling  Gastrointestinal: -abdominal pain, -blood in stool, -constipation, -diarrhea, -nausea, -vomiting, -reflux  Endocrine: -cold intolerance, -heat intolerance,  -polydipsia  Genitourinary: -bladder incontinence, -dysuria, -erectile dysfunction, -frequency, -hematuria, -testicular pain, -urgency, -nocturia  Musculoskeletal: -gait problem, -joint swelling, -myalgia  Neurological: -dizziness, -seizures, -numbness  Psychiatric/Behavioral: -agitation, -hallucinations, -nervous, -anxiety symptoms         Telephone on 02/13/2025   Component Date Value Ref Range Status    Creatinine, Urine 02/28/2025 91  20 - 275 mg/dL Final    Microalb, Ur 02/28/2025 0.2  See Note: mg/dL Final    Microalb/Creat Ratio 02/28/2025 2  <30 mg/g creat Final    TSH w/reflex to FT4 02/25/2025 2.07  0.40 - 4.50 mIU/L Final    Color, UA 02/28/2025 YELLOW  YELLOW Final    Appearance, UA 02/28/2025 CLEAR  CLEAR Final    Specific Gravity, UA 02/28/2025 1.018  1.001 - 1.035 Final    pH, UA 02/28/2025 6.5  5.0 - 8.0 Final    Glucose, UA 02/28/2025 NEGATIVE  NEGATIVE Final    Bilirubin, UA 02/28/2025 NEGATIVE  NEGATIVE Final    Ketones, UA 02/28/2025 NEGATIVE  NEGATIVE Final    Occult Blood UA 02/28/2025 NEGATIVE  NEGATIVE Final    Protein, UA 02/28/2025 NEGATIVE  NEGATIVE Final    Nitrite, UA 02/28/2025 NEGATIVE  NEGATIVE Final    Leukocytes, UA 02/28/2025 TRACE (A)  NEGATIVE Final    WBC Casts, UA 02/28/2025 NONE SEEN  < OR = 5 /HPF Final    RBC Casts, UA 02/28/2025 NONE SEEN  < OR = 2 /HPF Final    Squam Epithel, UA 02/28/2025 0-5  < OR = 5 /HPF Final    Bacteria, UA 02/28/2025 NONE SEEN  NONE SEEN /HPF Final    Hyaline Casts, UA 02/28/2025 NONE SEEN  NONE SEEN /LPF Final    Service Cmt: 02/28/2025 SEE COMMENT   Final    Reflexive Urine Culture 02/28/2025 SEE COMMENT   Final    Urine Culture, Routine 02/28/2025 SEE COMMENT   Final    WBC 02/25/2025 7.1  3.8 - 10.8 Thousand/uL Final    RBC 02/25/2025 4.81  3.80 - 5.10 Million/uL Final    Hemoglobin 02/25/2025 14.7  11.7 - 15.5 g/dL Final    Hematocrit 02/25/2025 44.2  35.0 - 45.0 % Final    MCV 02/25/2025 91.9  80.0 - 100.0 fL Final    MCH 02/25/2025 30.6  27.0  - 33.0 pg Final    MCHC 02/25/2025 33.3  32.0 - 36.0 g/dL Final    RDW 02/25/2025 13.6  11.0 - 15.0 % Final    Platelets 02/25/2025 320  140 - 400 Thousand/uL Final    MPV 02/25/2025 9.9  7.5 - 12.5 fL Final    Neutrophils, Abs 02/25/2025 3,593  1,500 - 7,800 cells/uL Final    Lymph # 02/25/2025 2,741  850 - 3,900 cells/uL Final    Mono # 02/25/2025 518  200 - 950 cells/uL Final    Eos # 02/25/2025 192  15 - 500 cells/uL Final    Baso # 02/25/2025 57  0 - 200 cells/uL Final    Neutrophils Relative 02/25/2025 50.6  % Final    Lymph % 02/25/2025 38.6  % Final    Mono % 02/25/2025 7.3  % Final    Eosinophil % 02/25/2025 2.7  % Final    Basophil % 02/25/2025 0.8  % Final   Patient Message on 11/13/2024   Component Date Value Ref Range Status    Glucose 11/14/2024 105 (H)  65 - 99 mg/dL Final    BUN 11/14/2024 21  7 - 25 mg/dL Final    Creatinine 11/14/2024 1.02 (H)  0.60 - 1.00 mg/dL Final    eGFR 11/14/2024 58 (L)  > OR = 60 mL/min/1.73m2 Final    BUN/Creatinine Ratio 11/14/2024 21  6 - 22 (calc) Final    Sodium 11/14/2024 140  135 - 146 mmol/L Final    Potassium 11/14/2024 4.0  3.5 - 5.3 mmol/L Final    Chloride 11/14/2024 99  98 - 110 mmol/L Final    CO2 11/14/2024 31  20 - 32 mmol/L Final    Calcium 11/14/2024 10.5 (H)  8.6 - 10.4 mg/dL Final    Total Protein 11/14/2024 7.1  6.1 - 8.1 g/dL Final    Albumin 11/14/2024 4.4  3.6 - 5.1 g/dL Final    Globulin, Total 11/14/2024 2.7  1.9 - 3.7 g/dL (calc) Final    Albumin/Globulin Ratio 11/14/2024 1.6  1.0 - 2.5 (calc) Final    Total Bilirubin 11/14/2024 0.5  0.2 - 1.2 mg/dL Final    Alkaline Phosphatase 11/14/2024 79  37 - 153 U/L Final    AST 11/14/2024 16  10 - 35 U/L Final    ALT 11/14/2024 16  6 - 29 U/L Final    Cholesterol 11/14/2024 229 (H)  <200 mg/dL Final    HDL 11/14/2024 72  > OR = 50 mg/dL Final    Triglycerides 11/14/2024 187 (H)  <150 mg/dL Final    LDL Cholesterol 11/14/2024 127 (H)  mg/dL (calc) Final    HDL/Cholesterol Ratio 11/14/2024 3.2  <5.0 (calc)  Final    Non HDL Chol. (LDL+VLDL) 2024 157 (H)  <130 mg/dL (calc) Final       Past Medical History:   Diagnosis Date    Chronic renal insufficiency, stage III (moderate) 2017    Esophageal reflux     Hyperlipidemia     Hypertension     OA (osteoarthritis) of hip     bilateral    Personal history of colonic polyps 2023    Colonoscopy Kobi Gonzalez MD     Social History[1]  Past Surgical History:   Procedure Laterality Date    APPENDECTOMY      BREAST BIOPSY      BREAST CYST ASPIRATION      CATARACT EXTRACTION Left 10/2018    COLONOSCOPY      Dr. -RT 7 years    EYE SURGERY      Lasix; laser tear in retina; macular pucker repair; cataract    JOINT REPLACEMENT Right 13    (R) DAPHNEY    JOINT REPLACEMENT Left 10/18/12    (L) DAPHNEY     Family History   Problem Relation Name Age of Onset    Arthritis Mother India Tavares             Heart disease Mother India Tavares         Hypertension, arrythmia, congestive heart failure    Hypertension Mother India Tavares     Kidney disease Mother India Tavares         Low function    Alcohol abuse Father Liam Ureña             Cancer Father Liam Ureña         Heavy smoker, squamous cell nasal    COPD Father Liam Ureña         Was on oxygen    Heart disease Father Liam Ureña         Heart attach at 54    Breast cancer Sister Honey A [place]     Heart disease Sister Honey VEGA [place]         Broken heart syndrome    Depression Daughter Eulalia Ureña Eulogio Beth         Dx with PCOS    Hearing loss Daughter Eulalia Urñea Eulogio Beth         Left ear    Alcohol abuse Maternal Grandmother Marilu Fajardo             Arthritis Maternal Grandmother Marilu Fajardo     COPD Maternal Grandmother Marilu Fajardo         Smoker    Learning disabilities Son Don Krishnan         ADHD    Melanoma Neg Hx      Lupus Neg Hx      Eczema Neg Hx      Psoriasis Neg Hx         The 10-year CVD  "risk score (Beni et al., 2008) is: 10.7%    Values used to calculate the score:      Age: 74 years      Sex: Female      Diabetic: No      Tobacco smoker: No      Systolic Blood Pressure: 132 mmHg      Is BP treated: No      HDL Cholesterol: 72 mg/dL      Total Cholesterol: 229 mg/dL    All of your core healthy metrics are met.      Review of patient's allergies indicates:   Allergen Reactions    Morphine Nausea And Vomiting     Current Medications[2]    Review of Systems   Constitutional:  Positive for activity change. Negative for unexpected weight change.   HENT:  Negative for hearing loss, rhinorrhea and trouble swallowing.    Eyes:  Negative for discharge and visual disturbance.   Respiratory:  Positive for chest tightness. Negative for wheezing.    Cardiovascular:  Positive for chest pain and palpitations.   Gastrointestinal:  Negative for blood in stool, constipation, diarrhea and vomiting.   Endocrine: Negative for polydipsia and polyuria.   Genitourinary:  Negative for difficulty urinating, dysuria, hematuria and menstrual problem.   Musculoskeletal:  Positive for arthralgias and neck pain. Negative for joint swelling.   Neurological:  Negative for weakness and headaches.   Psychiatric/Behavioral:  Negative for confusion and dysphoric mood.            Objective:      Vitals:    03/03/25 1036   BP: 132/86   Pulse: 82   SpO2: 98%   Weight: 108 kg (238 lb)   Height: 5' 5" (1.651 m)     Physical Exam  Vitals and nursing note reviewed.   Constitutional:       General: She is not in acute distress.     Appearance: Normal appearance. She is well-developed. She is obese. She is not toxic-appearing.   HENT:      Head: Normocephalic and atraumatic.      Right Ear: Tympanic membrane and external ear normal.      Left Ear: Tympanic membrane and external ear normal.      Nose: Nose normal.      Mouth/Throat:      Pharynx: Oropharynx is clear. No posterior oropharyngeal erythema.   Eyes:      Pupils: Pupils are " equal, round, and reactive to light.   Neck:      Thyroid: No thyromegaly.      Vascular: No carotid bruit.   Cardiovascular:      Rate and Rhythm: Normal rate and regular rhythm.      Heart sounds: Normal heart sounds. No murmur heard.  Pulmonary:      Effort: Pulmonary effort is normal.      Breath sounds: Normal breath sounds. No wheezing or rales.   Abdominal:      General: Bowel sounds are normal. There is no distension.      Palpations: Abdomen is soft.      Tenderness: There is no abdominal tenderness.   Musculoskeletal:         General: No tenderness or deformity. Normal range of motion.      Cervical back: Normal range of motion and neck supple.      Lumbar back: Normal. No spasms.      Comments: Bends 90 degrees at  waist, shoulders and knees have full range of motion, no pitting edema to lower extremities, right knee more crepitant than the left knee.   Lymphadenopathy:      Cervical: No cervical adenopathy.   Skin:     General: Skin is warm and dry.      Findings: No rash.   Neurological:      General: No focal deficit present.      Mental Status: She is alert and oriented to person, place, and time. Mental status is at baseline.      Cranial Nerves: No cranial nerve deficit.      Coordination: Coordination normal.      Gait: Gait normal.   Psychiatric:         Mood and Affect: Mood normal.         Behavior: Behavior normal.         Thought Content: Thought content normal.         Judgment: Judgment normal.       Physical Exam                  Assessment:       1. Primary osteoarthritis involving multiple joints    2. Mixed hyperlipidemia    3. Essential hypertension    4. Urge urinary incontinence    5. Menopause    6. Hypercalcemia    7. Morbid obesity    8. Hypothyroidism due to Hashimoto thyroiditis    9. Autoimmune thyroiditis    10. Gastro-esophageal reflux disease without esophagitis    11. Rotator cuff impingement syndrome of right shoulder    12. Presence of artificial hip joint, bilateral     13. Primary osteoarthritis of both knees         Plan:       Primary osteoarthritis involving multiple joints  Continue physical therapy at [facility] getting by on Arthritis Strength Tylenol for pain as well as Biofreeze spray  Mixed hyperlipidemia  -     Lipid Panel; Future; Expected date: 03/03/2025  -     Comprehensive Metabolic Panel; Future; Expected date: 03/03/2025  In 2 months repeat lipids to check on rosuvastatin efficacy  Essential hypertension  Blood pressure well controlled  Urge urinary incontinence  Stable at this time  Menopause  No hot flashes  Hypercalcemia    Morbid obesity  Has been on several  trips to  and [place]  Hypothyroidism due to Hashimoto thyroiditis  Continue Synthroid 25 mcg  Autoimmune thyroiditis  Continue Synthroid  Gastro-esophageal reflux disease without esophagitis  Nexium 20 mg as needed  Rotator cuff impingement syndrome of right shoulder  Shoulders have good range of motion  Presence of artificial hip joint, bilateral  Bilateral hips doing well  Primary osteoarthritis of both knees  Continue Arthritis Strength Tylenol    Assessment & Plan    IMPRESSION:  - Continued monitoring of arthritis symptoms in knees, back, and shoulders  - Assessed effectiveness of physical therapy in managing knee pain and improving mobility, particularly with stairs  - Evaluated cholesterol levels after 4 months on rosuvastatin  - Noted good bone density, no osteoporosis  - Reviewed normal mammogram from June and up-to-date colonoscopy  - Thyroid function optimal on current medication    MORBID OBESITY:  - Evaluated patient's mobility, noting improvement in daily activities but persistent difficulty with stairs, especially during recent trips to the [place] and [place].  - Acknowledged patient's efforts to avoid knee surgery through therapy.  - Prescribed physical therapy to further improve mobility and strengthen muscles around the knee.  - Advised continued use of a brace for  support during walking.    CHRONIC KIDNEY DISEASE, STAGE 3A:  - Monitored patient's recent urine tests, including a completed 24-hour urine collection, which were normal.  - Continued thyroid medication, which is working effectively.    HYPERLIPIDEMIA:  - Ligia's cholesterol level was high at 229 before the holidays.  - Continued rosuvastatin (Crestor generic) for cholesterol management, noting the switch from pravastatin 4 months ago.  - Ordered lipid panel for June to reassess cholesterol levels and evaluate the effectiveness of the new medication.  - Scheduled follow up in June for repeat lipid panel and other labs.    KNEE PAIN:  - X-ray results show presence of cartilage, not bone-on-bone.  - Recommend continued use of Tylenol as needed for pain management and advised using BioFreeze for pain relief while traveling.    SHOULDER ARTHRITIS:  - Noted 90% improvement in shoulder function compared to before, though patient still experiences some difficulty reaching top shelves.  - Assessed that shoulders will never be 100% due to arthritis.  - Recommend continued therapy with [facility], who has been noted to be effective.    HIP REPLACEMENT:  - Ligia had right hip replacement in October 2012 and left hip replacement in June 2013.  - Advised to maintain current exercise regimen as tolerated.         Follow up in about 6 months (around 9/3/2025).        This note was generated with the assistance of ambient listening technology. Verbal consent was obtained by the patient and accompanying visitor(s) for the recording of patient appointment to facilitate this note. I attest to having reviewed and edited the generated note for accuracy, though some syntax or spelling errors may persist. Please contact the author of this note for any clarification.      3/4/2025 Hemant Zhang           [1]   Social History  Socioeconomic History    Marital status:    Tobacco Use    Smoking status: Never    Smokeless tobacco:  Never   Substance and Sexual Activity    Alcohol use: Yes     Alcohol/week: 1.0 standard drink of alcohol     Types: 1 Glasses of wine per week     Comment: 1 per month    Drug use: Never    Sexual activity: Not Currently     Partners: Male     Birth control/protection: Partner-Vasectomy, Post-menopausal     Social Drivers of Health     Financial Resource Strain: Low Risk  (2/13/2024)    Overall Financial Resource Strain (CARDIA)     Difficulty of Paying Living Expenses: Not hard at all   Food Insecurity: No Food Insecurity (2/13/2024)    Hunger Vital Sign     Worried About Running Out of Food in the Last Year: Never true     Ran Out of Food in the Last Year: Never true   Transportation Needs: No Transportation Needs (2/13/2024)    PRAPARE - Transportation     Lack of Transportation (Medical): No     Lack of Transportation (Non-Medical): No   Physical Activity: Inactive (2/13/2024)    Exercise Vital Sign     Days of Exercise per Week: 0 days     Minutes of Exercise per Session: 0 min   Stress: No Stress Concern Present (2/13/2024)    [employer] [place] [employer] - Occupational Stress Questionnaire     Feeling of Stress : Not at all   Housing Stability: Low Risk  (2/13/2024)    Housing Stability Vital Sign     Unable to Pay for Housing in the Last Year: No     Number of Places Lived in the Last Year: 1     Unstable Housing in the Last Year: No   [2]   Current Outpatient Medications:     acetaminophen (TYLENOL) 650 MG TbSR, , Disp: , Rfl:     amLODIPine (NORVASC) 5 MG tablet, Take 1 tablet (5 mg total) by mouth once daily., Disp: 90 tablet, Rfl: 3    APPLE CIDER VINEGAR ORAL, Take by mouth., Disp: , Rfl:     b complex vitamins tablet, Take 1 tablet by mouth once daily., Disp: , Rfl:     biotin 10,000 mcg Cap, Take 1 capsule by mouth once daily. , Disp: , Rfl:     esomeprazole (NEXIUM) 20 MG capsule, Take 1 capsule (20 mg total) by mouth before breakfast., Disp: 90 capsule, Rfl: 1    estradiol (ESTRACE) 0.5  MG tablet, Take 0.25 mg by mouth once daily. , Disp: , Rfl: 0    hydroCHLOROthiazide (HYDRODIURIL) 25 MG tablet, Take 1 tablet (25 mg total) by mouth once daily., Disp: 90 tablet, Rfl: 3    LACTOBACILLUS ACIDOPHILUS (PROBIOTIC ORAL), Take by mouth., Disp: , Rfl:     melatonin 10 mg Tab, Take 10 mg by mouth once daily., Disp: , Rfl:     metoprolol succinate (TOPROL-XL) 25 MG 24 hr tablet, Take 1 tablet (25 mg total) by mouth 2 (two) times daily., Disp: 180 tablet, Rfl: 3    multivitamin capsule, Take 1 capsule by mouth daily as needed. , Disp: , Rfl:     omega-3 fatty acids/fish oil (FISH OIL-OMEGA-3 FATTY ACIDS) 300-1,000 mg capsule, Take 2 capsules by mouth once daily. 3 capsule PRO-OMEGA 650 EPA // 450 DHA, Disp: , Rfl:     oxybutynin (DITROPAN-XL) 10 MG 24 hr tablet, Take 10 mg by mouth once daily., Disp: , Rfl:     progesterone (PROMETRIUM) 200 MG capsule, Take 100 mg by mouth every evening., Disp: , Rfl:     rosuvastatin (CRESTOR) 20 MG tablet, Take 1 tablet (20 mg total) by mouth once daily., Disp: 90 tablet, Rfl: 3    selenium 200 mcg Cap, Take by mouth., Disp: , Rfl:     SYNTHROID 25 mcg tablet, , Disp: , Rfl:     vitamin D (VITAMIN D3) 1000 units Tab, Take 4,000 Units by mouth once daily., Disp: , Rfl:     pravastatin (PRAVACHOL) 40 MG tablet, Take 1 tablet (40 mg total) by mouth once daily., Disp: 90 tablet, Rfl: 3

## 2025-03-13 DIAGNOSIS — I10 ESSENTIAL HYPERTENSION: ICD-10-CM

## 2025-03-13 RX ORDER — HYDROCHLOROTHIAZIDE 25 MG/1
25 TABLET ORAL DAILY
Qty: 90 TABLET | Refills: 3 | Status: SHIPPED | OUTPATIENT
Start: 2025-03-13 | End: 2026-03-13

## 2025-03-13 RX ORDER — AMLODIPINE BESYLATE 5 MG/1
5 TABLET ORAL DAILY
Qty: 90 TABLET | Refills: 3 | Status: SHIPPED | OUTPATIENT
Start: 2025-03-13 | End: 2025-04-12

## 2025-03-13 RX ORDER — METOPROLOL SUCCINATE 25 MG/1
25 TABLET, EXTENDED RELEASE ORAL 2 TIMES DAILY
Qty: 180 TABLET | Refills: 3 | Status: SHIPPED | OUTPATIENT
Start: 2025-03-13 | End: 2026-03-13

## 2025-04-08 DIAGNOSIS — Z12.31 ENCOUNTER FOR SCREENING MAMMOGRAM FOR MALIGNANT NEOPLASM OF BREAST: Primary | ICD-10-CM

## 2025-05-07 ENCOUNTER — TELEPHONE (OUTPATIENT)
Dept: FAMILY MEDICINE | Facility: CLINIC | Age: 75
End: 2025-05-07
Payer: MEDICARE

## 2025-05-07 NOTE — TELEPHONE ENCOUNTER
"----- Message from Tech Tammy sent at 5/5/2025  7:01 AM CDT -----    ----- Message -----  From: Naty Smalls LPN  Sent: 5/5/2025  12:00 AM CDT  To: Hemant Zhang Staff    Per Dr. Zhang " remind me for labs in 2 month"  "

## 2025-05-21 ENCOUNTER — RESULTS FOLLOW-UP (OUTPATIENT)
Dept: FAMILY MEDICINE | Facility: CLINIC | Age: 75
End: 2025-05-21
Payer: MEDICARE

## 2025-05-21 NOTE — PROGRESS NOTES
Call patient.  Blood work looks improved, blood sugar 105 kidneys and liver are normal.  Cholesterol has dropped to 170 triglycerides are down to 149.  Now back to normal.  Continue current medications and diet.

## 2025-06-16 ENCOUNTER — HOSPITAL ENCOUNTER (OUTPATIENT)
Dept: RADIOLOGY | Facility: HOSPITAL | Age: 75
Discharge: HOME | End: 2025-06-16
Attending: OBSTETRICS & GYNECOLOGY
Payer: MEDICARE

## 2025-06-16 VITALS — WEIGHT: 238 LBS | BODY MASS INDEX: 39.65 KG/M2 | HEIGHT: 65 IN

## 2025-06-16 DIAGNOSIS — Z12.31 ENCOUNTER FOR SCREENING MAMMOGRAM FOR MALIGNANT NEOPLASM OF BREAST: ICD-10-CM

## 2025-06-16 PROCEDURE — 77063 BREAST TOMOSYNTHESIS BI: CPT | Mod: TC,PO

## 2025-06-16 PROCEDURE — 77067 SCR MAMMO BI INCL CAD: CPT | Mod: 26,,, | Performed by: RADIOLOGY

## 2025-06-16 PROCEDURE — 77063 BREAST TOMOSYNTHESIS BI: CPT | Mod: 26,,, | Performed by: RADIOLOGY

## 2025-09-02 ENCOUNTER — TELEPHONE (OUTPATIENT)
Dept: FAMILY MEDICINE | Facility: CLINIC | Age: 75
End: 2025-09-02
Payer: MEDICARE

## 2025-09-02 DIAGNOSIS — I10 ESSENTIAL HYPERTENSION: ICD-10-CM

## 2025-09-02 DIAGNOSIS — K21.9 GASTRO-ESOPHAGEAL REFLUX DISEASE WITHOUT ESOPHAGITIS: ICD-10-CM

## 2025-09-02 DIAGNOSIS — E66.01 MORBID OBESITY: ICD-10-CM

## 2025-09-02 DIAGNOSIS — E78.2 MIXED HYPERLIPIDEMIA: ICD-10-CM

## 2025-09-02 DIAGNOSIS — E83.52 HYPERCALCEMIA: ICD-10-CM

## 2025-09-02 DIAGNOSIS — E06.3 HYPOTHYROIDISM DUE TO HASHIMOTO THYROIDITIS: ICD-10-CM

## 2025-09-02 DIAGNOSIS — E78.5 HYPERLIPIDEMIA, UNSPECIFIED HYPERLIPIDEMIA TYPE: ICD-10-CM

## 2025-09-02 DIAGNOSIS — E06.3 HYPOTHYROIDISM DUE TO HASHIMOTO'S THYROIDITIS: ICD-10-CM

## 2025-09-02 DIAGNOSIS — Z79.899 ENCOUNTER FOR LONG-TERM (CURRENT) USE OF MEDICATIONS: Primary | ICD-10-CM

## 2025-09-03 LAB
ALBUMIN SERPL-MCNC: 4.4 G/DL (ref 3.6–5.1)
ALBUMIN/GLOB SERPL: 1.6 (CALC) (ref 1–2.5)
ALP SERPL-CCNC: 98 U/L (ref 37–153)
ALT SERPL-CCNC: 18 U/L (ref 6–29)
AST SERPL-CCNC: 18 U/L (ref 10–35)
BILIRUB SERPL-MCNC: 0.5 MG/DL (ref 0.2–1.2)
BUN SERPL-MCNC: 26 MG/DL (ref 7–25)
BUN/CREAT SERPL: 25 (CALC) (ref 6–22)
CALCIUM SERPL-MCNC: 10.2 MG/DL (ref 8.6–10.4)
CHLORIDE SERPL-SCNC: 99 MMOL/L (ref 98–110)
CHOLEST SERPL-MCNC: 159 MG/DL
CHOLEST/HDLC SERPL: 2.1 (CALC)
CO2 SERPL-SCNC: 33 MMOL/L (ref 20–32)
CREAT SERPL-MCNC: 1.05 MG/DL (ref 0.6–1)
EGFR: 56 ML/MIN/1.73M2
GLOBULIN SER CALC-MCNC: 2.8 G/DL (CALC) (ref 1.9–3.7)
GLUCOSE SERPL-MCNC: 112 MG/DL (ref 65–99)
HDLC SERPL-MCNC: 74 MG/DL
LDLC SERPL CALC-MCNC: 67 MG/DL (CALC)
NONHDLC SERPL-MCNC: 85 MG/DL (CALC)
POTASSIUM SERPL-SCNC: 4.4 MMOL/L (ref 3.5–5.3)
PROT SERPL-MCNC: 7.2 G/DL (ref 6.1–8.1)
SODIUM SERPL-SCNC: 139 MMOL/L (ref 135–146)
TRIGL SERPL-MCNC: 101 MG/DL

## 2025-09-05 ENCOUNTER — OFFICE VISIT (OUTPATIENT)
Dept: FAMILY MEDICINE | Facility: CLINIC | Age: 75
End: 2025-09-05
Payer: MEDICARE

## 2025-09-05 VITALS
BODY MASS INDEX: 40.15 KG/M2 | WEIGHT: 241 LBS | DIASTOLIC BLOOD PRESSURE: 80 MMHG | OXYGEN SATURATION: 96 % | HEIGHT: 65 IN | RESPIRATION RATE: 18 BRPM | HEART RATE: 86 BPM | SYSTOLIC BLOOD PRESSURE: 138 MMHG

## 2025-09-05 DIAGNOSIS — I10 ESSENTIAL HYPERTENSION: Primary | ICD-10-CM

## 2025-09-05 DIAGNOSIS — E78.5 HYPERLIPIDEMIA, UNSPECIFIED HYPERLIPIDEMIA TYPE: ICD-10-CM

## 2025-09-05 DIAGNOSIS — M15.0 PRIMARY OSTEOARTHRITIS INVOLVING MULTIPLE JOINTS: ICD-10-CM

## 2025-09-05 RX ORDER — MELOXICAM 15 MG/1
15 TABLET ORAL DAILY
COMMUNITY
End: 2025-09-05

## 2025-09-05 RX ORDER — MELOXICAM 7.5 MG/1
7.5 TABLET ORAL DAILY
Qty: 30 TABLET | Refills: 2 | Status: SHIPPED | OUTPATIENT
Start: 2025-09-05 | End: 2025-12-04

## 2025-09-05 RX ORDER — LATANOPROST 50 UG/ML
1 SOLUTION/ DROPS OPHTHALMIC NIGHTLY
COMMUNITY